# Patient Record
Sex: MALE | Race: WHITE | Employment: FULL TIME | URBAN - NONMETROPOLITAN AREA
[De-identification: names, ages, dates, MRNs, and addresses within clinical notes are randomized per-mention and may not be internally consistent; named-entity substitution may affect disease eponyms.]

---

## 2017-01-17 ENCOUNTER — OFFICE VISIT (OUTPATIENT)
Dept: URGENT CARE | Facility: PHYSICIAN GROUP | Age: 37
End: 2017-01-17
Payer: COMMERCIAL

## 2017-01-17 VITALS
HEART RATE: 70 BPM | BODY MASS INDEX: 35.5 KG/M2 | DIASTOLIC BLOOD PRESSURE: 72 MMHG | SYSTOLIC BLOOD PRESSURE: 130 MMHG | HEIGHT: 70 IN | RESPIRATION RATE: 16 BRPM | WEIGHT: 248 LBS | OXYGEN SATURATION: 98 % | TEMPERATURE: 98.4 F

## 2017-01-17 DIAGNOSIS — H10.9 BACTERIAL CONJUNCTIVITIS OF LEFT EYE: ICD-10-CM

## 2017-01-17 DIAGNOSIS — J98.8 RTI (RESPIRATORY TRACT INFECTION): ICD-10-CM

## 2017-01-17 PROCEDURE — 99203 OFFICE O/P NEW LOW 30 MIN: CPT | Mod: 25 | Performed by: PHYSICIAN ASSISTANT

## 2017-01-17 RX ORDER — AZITHROMYCIN 250 MG/1
TABLET, FILM COATED ORAL
Qty: 6 TAB | Refills: 0 | Status: SHIPPED | OUTPATIENT
Start: 2017-01-17 | End: 2017-08-01

## 2017-01-17 RX ORDER — POLYMYXIN B SULFATE AND TRIMETHOPRIM 1; 10000 MG/ML; [USP'U]/ML
1 SOLUTION OPHTHALMIC EVERY 4 HOURS
Qty: 10 ML | Refills: 0 | Status: SHIPPED | OUTPATIENT
Start: 2017-01-17 | End: 2017-08-01

## 2017-01-17 ASSESSMENT — ENCOUNTER SYMPTOMS
PALPITATIONS: 0
CHILLS: 1
EYE PAIN: 0
WHEEZING: 0
COUGH: 1
DOUBLE VISION: 0
FOREIGN BODY SENSATION: 0
EYE REDNESS: 1
ABDOMINAL PAIN: 0
VOMITING: 0
SORE THROAT: 1
NAUSEA: 0
BLURRED VISION: 0
MYALGIAS: 1
EYE DISCHARGE: 1
HEADACHES: 1
FEVER: 0
DIZZINESS: 0
SINUS PRESSURE: 1
DIARRHEA: 0
SHORTNESS OF BREATH: 0
SWOLLEN GLANDS: 1

## 2017-01-17 NOTE — PROGRESS NOTES
Subjective:      Kapil Small is a 36 y.o. male who presents with Sinus Problem            Sinus Problem  This is a new problem. The current episode started 1 to 4 weeks ago. The problem has been gradually worsening since onset. There has been no fever. His pain is at a severity of 6/10. The pain is moderate. Associated symptoms include chills, congestion, coughing, headaches, sinus pressure, a sore throat and swollen glands. Pertinent negatives include no ear pain or shortness of breath. Past treatments include nothing. The treatment provided no relief.   Eye Problem   The left eye is affected. This is a new problem. The current episode started in the past 7 days. The problem occurs constantly. The problem has been gradually worsening. There was no injury mechanism. The patient is experiencing no pain. There is no known exposure to pink eye. He does not wear contacts. Associated symptoms include an eye discharge, eye redness and a recent URI. Pertinent negatives include no blurred vision, double vision, fever, foreign body sensation, itching, nausea or vomiting. He has tried eye drops and water for the symptoms. The treatment provided no relief.       PMH:  has no past medical history on file.  MEDS: No current outpatient prescriptions on file.  ALLERGIES:   Allergies   Allergen Reactions   • Pcn [Penicillins] Hives     SURGHX: No past surgical history on file.  SOCHX:    FH: family history is not on file.    Review of Systems   Constitutional: Positive for chills and malaise/fatigue. Negative for fever.   HENT: Positive for congestion, sinus pressure and sore throat. Negative for ear pain.    Eyes: Positive for discharge and redness. Negative for blurred vision, double vision and pain.   Respiratory: Positive for cough. Negative for shortness of breath and wheezing.    Cardiovascular: Negative for chest pain, palpitations and leg swelling.   Gastrointestinal: Negative for nausea, vomiting, abdominal pain and  "diarrhea.   Musculoskeletal: Positive for myalgias.   Skin: Negative for itching and rash.   Neurological: Positive for headaches. Negative for dizziness.       Medications, Allergies, and current problem list reviewed today in Epic       Objective:     /72 mmHg  Pulse 70  Temp(Src) 36.9 °C (98.4 °F)  Resp 16  Ht 1.778 m (5' 10\")  Wt 112.492 kg (248 lb)  BMI 35.58 kg/m2  SpO2 98%     Physical Exam   Constitutional: He is oriented to person, place, and time. He appears well-developed and well-nourished. No distress.   HENT:   Head: Normocephalic.   Right Ear: Hearing, tympanic membrane and external ear normal.   Left Ear: Hearing, tympanic membrane and external ear normal.   Nose: Right sinus exhibits maxillary sinus tenderness. Left sinus exhibits maxillary sinus tenderness.   Mouth/Throat: Normal dentition. No dental caries. Posterior oropharyngeal erythema present. No oropharyngeal exudate.   Eyes: Conjunctivae and EOM are normal. Pupils are equal, round, and reactive to light. Lids are everted and swept, no foreign bodies found. Right eye exhibits no discharge and no exudate. No foreign body present in the right eye. Left eye exhibits exudate. Left eye exhibits no discharge. No foreign body present in the left eye. Right conjunctiva is not injected. No scleral icterus.   Slit lamp exam:       The right eye shows no corneal abrasion, no foreign body and no fluorescein uptake.        The left eye shows no corneal abrasion, no foreign body and no fluorescein uptake.   Neck: Normal range of motion. Neck supple. No tracheal deviation present. No thyromegaly present.   Cardiovascular: Normal rate, regular rhythm and normal heart sounds.    No murmur heard.  Pulmonary/Chest: Effort normal and breath sounds normal. No respiratory distress. He has no wheezes. He exhibits no tenderness.   Lymphadenopathy:        Head (right side): Submandibular adenopathy present.        Head (left side): Submandibular " adenopathy present.     He has no cervical adenopathy.        Right cervical: No superficial cervical adenopathy present.       Left cervical: No superficial cervical adenopathy present.   Neurological: He is alert and oriented to person, place, and time.   Skin: Skin is warm and dry. He is not diaphoretic. No cyanosis. Nails show no clubbing.   Psychiatric: He has a normal mood and affect. His behavior is normal. Judgment and thought content normal.   Nursing note and vitals reviewed.         Assessment/Plan:     1. RTI (respiratory tract infection)  azithromycin (ZITHROMAX Z-ANGELIKA) 250 MG Tab   2. Bacterial conjunctivitis of left eye  polymixin-trimethoprim (POLYTRIM) 93154-2.1 UNIT/ML-% Solution     1. Take full course of antibiotic  Tylenol and Motrin for fever and pain  OTC meds as discussed including oral decongestant if tolerated  Increase fluids and rest  Nasal spray, nasal wash, Tahira pot    2. Drops as directed, warm compresses    Return to clinic or go to ED if symptoms worsen or persist. Indications for ED discussed at length. Patient voices understanding. Follow-up with your primary care provider in 3-5 days. Red flags discussed.    Please note that this dictation was created using voice recognition software. I have made every reasonable attempt to correct obvious errors, but I expect that there are errors of grammar and possibly content that I did not discover before finalizing the note.

## 2017-01-17 NOTE — MR AVS SNAPSHOT
"        Kapil Small   2017 9:30 AM   Office Visit   MRN: [id number]    Department:  [facility]   Dept Phone:  [phone]    Description:  Male : 1980   Provider:  Dave Wu PA-C           Reason for Visit     Sinus Problem sore throat, eye issues      Allergies as of 2017     Allergen Noted Reactions    Pcn [Penicillins] 2017   Hives      You were diagnosed with     RTI (respiratory tract infection)   [467791]       Bacterial conjunctivitis of left eye   [235764]         Vital Signs     Blood Pressure Pulse Temperature Respirations Height Weight    130/72 mmHg 70 36.9 °C (98.4 °F) 16 1.778 m (5' 10\") 112.492 kg (248 lb)    Body Mass Index Oxygen Saturation                35.58 kg/m2 98%          Basic Information     Date Of Birth Sex Race Preferred Language       1980 Male White English       Health Maintenance     Patient has no pending health maintenance at this time      Current Immunizations     No immunizations on file.      Below and/or attached are the medications your provider expects you to take. Review all of your home medications and newly ordered medications with your provider and/or pharmacist. Follow medication instructions as directed by your provider and/or pharmacist. Please keep your medication list with you and share with your provider. Update the information when medications are discontinued, doses are changed, or new medications (including over-the-counter products) are added; and carry medication information at all times in the event of emergency situations     Allergies:  PCN - Hives               Medications  Valid as of: 2017 - 10:34 AM    Generic Name Brand Name Tablet Size Instructions for use    Azithromycin (Tab) ZITHROMAX 250 MG Z-walter, Use as directed.        Polymyxin B-Trimethoprim (Solution) POLYTRIM 32102-1.1 UNIT/ML-% Place 1 Drop in left eye every 4 hours.        .                 Medicines prescribed today were sent to: "     [facility] [address] [place] [address] - [address] [facility]    [address] [facility] [address]    Phone: [phone] Fax: [phone]    Open 24 Hours?: No      Medication refill instructions:       If your prescription bottle indicates you have medication refills left, it is not necessary to call your provider’s office. Please contact your pharmacy and they will refill your medication.    If your prescription bottle indicates you do not have any refills left, you may request refills at any time through one of the following ways: The online [email or web address] system (except Urgent Care), by calling your provider’s office, or by asking your pharmacy to contact your provider’s office with a refill request. Medication refills are processed only during regular business hours and may not be available until the next business day. Your provider may request additional information or to have a follow-up visit with you prior to refilling your medication.   *Please Note: Medication refills are assigned a new Rx number when refilled electronically. Your pharmacy may indicate that no refills were authorized even though a new prescription for the same medication is available at the pharmacy. Please request the medicine by name with the pharmacy before contacting your provider for a refill.           [email or web address].com Access Code: [email or web address]  Expires: 2/16/2017 10:34 AM    OSOYOU.com  A secure, online tool to manage your health information     [employer]’s OSOYOU.com® is a secure, online tool that connects you to your personalized health information from the privacy of your home -- day or night - making it very easy for you to manage your healthcare. Once the activation process is completed, you can even access your medical information using the OSOYOU.com binh, which is available for free in the Apple Binh store or Google Play store.     OSOYOU.com provides the following levels of access (as shown below):   My Chart Features   [facility]  Primary Care Doctor [facility]  Specialists [facility]  Urgent  Care [facility]  Primary Care  Doctor   Email your healthcare team securely and privately 24/7 X X X    Manage appointments: schedule your next appointment; view details of past/upcoming appointments X      Request prescription refills. X      View recent personal medical records, including lab and immunizations X X X X   View health record, including health history, allergies, medications X X X X   Read reports about your outpatient visits, procedures, consult and ER notes X X X X   See your discharge summary, which is a recap of your hospital and/or ER visit that includes your diagnosis, lab results, and care plan. X X       How to register for [employer] Systems:  1. Go to  [email or web address].  2. Click on the Sign Up Now box, which takes you to the New Member Sign Up page. You will need to provide the following information:  a. Enter your Plex Systems Access Code exactly as it appears at the top of this page. (You will not need to use this code after you’ve completed the sign-up process. If you do not sign up before the expiration date, you must request a new code.)   b. Enter your date of birth.   c. Enter your home email address.   d. Click Submit, and follow the next screen’s instructions.  3. Create a Plex Systems ID. This will be your Plex Systems login ID and cannot be changed, so think of one that is secure and easy to remember.  4. Create a Plex Systems password. You can change your password at any time.  5. Enter your Password Reset Question and Answer. This can be used at a later time if you forget your password.   6. Enter your e-mail address. This allows you to receive e-mail notifications when new information is available in Plex Systems.  7. Click Sign Up. You can now view your health information.    For assistance activating your Plex Systems account, call [phone]

## 2017-05-17 ENCOUNTER — OFFICE VISIT (OUTPATIENT)
Dept: URGENT CARE | Facility: PHYSICIAN GROUP | Age: 37
End: 2017-05-17
Payer: COMMERCIAL

## 2017-05-17 VITALS
BODY MASS INDEX: 35.73 KG/M2 | SYSTOLIC BLOOD PRESSURE: 132 MMHG | WEIGHT: 249 LBS | HEART RATE: 72 BPM | RESPIRATION RATE: 16 BRPM | DIASTOLIC BLOOD PRESSURE: 100 MMHG | TEMPERATURE: 97.8 F | OXYGEN SATURATION: 95 %

## 2017-05-17 DIAGNOSIS — H60.502 ACUTE OTITIS EXTERNA OF LEFT EAR, UNSPECIFIED TYPE: ICD-10-CM

## 2017-05-17 PROCEDURE — 99214 OFFICE O/P EST MOD 30 MIN: CPT | Performed by: PHYSICIAN ASSISTANT

## 2017-05-17 RX ORDER — NEOMYCIN SULFATE, POLYMYXIN B SULFATE, HYDROCORTISONE 3.5; 10000; 1 MG/ML; [USP'U]/ML; MG/ML
4 SOLUTION/ DROPS AURICULAR (OTIC) 4 TIMES DAILY
Qty: 1 BOTTLE | Refills: 0 | Status: SHIPPED | OUTPATIENT
Start: 2017-05-17 | End: 2017-10-09

## 2017-05-17 NOTE — MR AVS SNAPSHOT
Kapil Small   2017 4:20 PM   Office Visit   MRN: [id number]    Department:  [facility]   Dept Phone:  [phone]    Description:  Male : 1980   Provider:  Kenji Roman PA-C           Reason for Visit     Otalgia L, x1.5 days      Allergies as of 2017     Allergen Noted Reactions    Pcn [Penicillins] 2017   Hives      You were diagnosed with     Acute otitis externa of left ear, unspecified type   [5654094]         Vital Signs     Blood Pressure Pulse Temperature Respirations Weight Oxygen Saturation    132/100 mmHg 72 36.6 °C (97.8 °F) 16 112.946 kg (249 lb) 95%    Smoking Status                   Never Smoker            Basic Information     Date Of Birth Sex Race Ethnicity Preferred Language    1980 Male White Unknown English      Your appointments     May 18, 2017 10:00 AM   New Patient with C Rotation   [facility] (--)    [address] [facility]  [place] [id number] [phone]           Please bring enclosed paperwork completed along with your insurance card and photo ID.              Health Maintenance        Date Due Completion Dates    IMM DTaP/Tdap/Td Vaccine (1 - Tdap) 1999 ---            Current Immunizations     No immunizations on file.      Below and/or attached are the medications your provider expects you to take. Review all of your home medications and newly ordered medications with your provider and/or pharmacist. Follow medication instructions as directed by your provider and/or pharmacist. Please keep your medication list with you and share with your provider. Update the information when medications are discontinued, doses are changed, or new medications (including over-the-counter products) are added; and carry medication information at all times in the event of emergency situations     Allergies:  PCN - Hives               Medications  Valid as of: May 17, 2017 -  5:22 PM    Generic Name Brand Name  Tablet Size Instructions for use    Azithromycin (Tab) ZITHROMAX 250 MG Z-walter, Use as directed.        Neomycin-Polymyxin-HC (Solution) NEOMYCIN-POLYMYXIN-HC (OTIC) 1 % Place 4 Drops in ear 4 times a day.        Polymyxin B-Trimethoprim (Solution) POLYTRIM 96543-5.1 UNIT/ML-% Place 1 Drop in left eye every 4 hours.        .                 Medicines prescribed today were sent to:     [facility] [address] [place] [address], [place] - [address] [facility]    [address] [facility] [address]    Phone: [phone] Fax: [phone]    Open 24 Hours?: No      Medication refill instructions:       If your prescription bottle indicates you have medication refills left, it is not necessary to call your provider’s office. Please contact your pharmacy and they will refill your medication.    If your prescription bottle indicates you do not have any refills left, you may request refills at any time through one of the following ways: The online [employer] Enterprise system (except Urgent Care), by calling your provider’s office, or by asking your pharmacy to contact your provider’s office with a refill request. Medication refills are processed only during regular business hours and may not be available until the next business day. Your provider may request additional information or to have a follow-up visit with you prior to refilling your medication.   *Please Note: Medication refills are assigned a new Rx number when refilled electronically. Your pharmacy may indicate that no refills were authorized even though a new prescription for the same medication is available at the pharmacy. Please request the medicine by name with the pharmacy before contacting your provider for a refill.           Process System Enterprise Access Code: [email or web address]  Expires: 6/16/2017  5:22 PM    Process System Enterprise  A secure, online tool to manage your health information     [employer]’s Process System Enterprise® is a secure, online tool that connects you to your personalized health information  from the privacy of your home -- day or night - making it very easy for you to manage your healthcare. Once the activation process is completed, you can even access your medical information using the mobiliThink binh, which is available for free in the Apple Binh store or Google Play store.     mobiliThink provides the following levels of access (as shown below):   My Chart Features   [facility] Primary Care Doctor [facility]  Specialists [facility]  Urgent  Care [facility]  Primary Care  Doctor   Email your healthcare team securely and privately 24/7 X X X    Manage appointments: schedule your next appointment; view details of past/upcoming appointments X      Request prescription refills. X      View recent personal medical records, including lab and immunizations X X X X   View health record, including health history, allergies, medications X X X X   Read reports about your outpatient visits, procedures, consult and ER notes X X X X   See your discharge summary, which is a recap of your hospital and/or ER visit that includes your diagnosis, lab results, and care plan. X X       How to register for mobiliThink:  1. Go to  [email or web address].  2. Click on the Sign Up Now box, which takes you to the New Member Sign Up page. You will need to provide the following information:  a. Enter your mobiliThink Access Code exactly as it appears at the top of this page. (You will not need to use this code after you’ve completed the sign-up process. If you do not sign up before the expiration date, you must request a new code.)   b. Enter your date of birth.   c. Enter your home email address.   d. Click Submit, and follow the next screen’s instructions.  3. Create a mobiliThink ID. This will be your mobiliThink login ID and cannot be changed, so think of one that is secure and easy to remember.  4. Create a mobiliThink password. You can change your password at any time.  5. Enter your Password Reset Question and Answer. This can be used at a later time if you  forget your password.   6. Enter your e-mail address. This allows you to receive e-mail notifications when new information is available in Sag[employer].  7. Click Sign Up. You can now view your health information.    For assistance activating your [employer] account, call [phone]

## 2017-05-17 NOTE — PROGRESS NOTES
Chief Complaint   Patient presents with   • Otalgia     L, x1.5 days       HISTORY OF PRESENT ILLNESS: Patient is a 36 y.o. male who presents today because he has a 1-2 day history of left ear pain. The pain is in the left ear, it hurts to touch his left ear. He has not been putting any drops in it. He denies any fevers, chills, nausea, vomiting or diarrhea.    There are no active problems to display for this patient.      Allergies:Pcn    Current Outpatient Prescriptions Ordered in [facility]   Medication Sig Dispense Refill   • NEOMYCIN-POLYMYXIN-HC, OTIC, 1 % Solution Place 4 Drops in ear 4 times a day. 1 Bottle 0   • polymixin-trimethoprim (POLYTRIM) 62887-6.1 UNIT/ML-% Solution Place 1 Drop in left eye every 4 hours. 10 mL 0   • azithromycin (ZITHROMAX Z-ANGELIKA) 250 MG Tab Z-angelika, Use as directed. 6 Tab 0     No current Epic-ordered facility-administered medications on file.       No past medical history on file.    Social History   Substance Use Topics   • Smoking status: Never Smoker    • Smokeless tobacco: None   • Alcohol Use: None       No family status information on file.   No family history on file.    ROS:  Review of Systems   Constitutional: Negative for fever, chills, weight loss and malaise/fatigue.   HENT: Positive for left external ear pain, no nosebleeds, congestion, sore throat and neck pain.    Eyes: Negative for blurred vision.   Respiratory: Negative for cough, sputum production, shortness of breath and wheezing.    Cardiovascular: Negative for chest pain, palpitations, orthopnea and leg swelling.   Gastrointestinal: Negative for heartburn, nausea, vomiting and abdominal pain.       Exam:  Blood pressure 132/100, pulse 72, temperature 36.6 °C (97.8 °F), resp. rate 16, weight 112.946 kg (249 lb), SpO2 95 %.  General:  Well nourished, well developed male in NAD  Head:Normocephalic, atraumatic  Eyes: PERRLA, EOM within normal limits, no conjunctival injection, no scleral icterus, visual fields and acuity  grossly intact.  Ears: Normal shape and symmetry, he has left tragal tenderness, no discharge. External canal on the left is erythematous and edematous, external canal. The right is without any significant edema or erythema. Tympanic membranes are without any inflammation, no effusion. Gross auditory acuity is intact  Nose: Symmetrical without tenderness, no discharge.  Mouth: reasonable hygiene, no erythema exudates or tonsillar enlargement.  Neck: no masses, range of motion within normal limits, no tracheal deviation. No obvious thyroid enlargement.  Pulmonary: chest is symmetrical with respiration, no wheezes, crackles, or rhonchi.  Cardiovascular: regular rate and rhythm without murmurs, rubs, or gallops.  Extremities: no clubbing, cyanosis, or edema.    Please note that this dictation was created using voice recognition software. I have made every reasonable attempt to correct obvious errors, but I expect that there are errors of grammar and possibly content that I did not discover before finalizing the note.    Assessment/Plan:  1. Acute otitis externa of left ear, unspecified type  NEOMYCIN-POLYMYXIN-HC, OTIC, 1 % Solution   , Tylenol or ibuprofen as tolerated    Followup with primary care in the next 7-10 days if not significantly improving, return to the urgent care or go to the emergency room sooner for any worsening of symptoms.

## 2017-05-18 ENCOUNTER — SLEEP CENTER VISIT (OUTPATIENT)
Dept: SLEEP MEDICINE | Facility: MEDICAL CENTER | Age: 37
End: 2017-05-18
Payer: COMMERCIAL

## 2017-05-18 VITALS
DIASTOLIC BLOOD PRESSURE: 92 MMHG | TEMPERATURE: 97 F | BODY MASS INDEX: 34.93 KG/M2 | HEART RATE: 64 BPM | SYSTOLIC BLOOD PRESSURE: 130 MMHG | OXYGEN SATURATION: 95 % | RESPIRATION RATE: 16 BRPM | WEIGHT: 244 LBS | HEIGHT: 70 IN

## 2017-05-18 DIAGNOSIS — R06.83 SNORING: ICD-10-CM

## 2017-05-18 DIAGNOSIS — G47.10 HYPERSOMNOLENCE: ICD-10-CM

## 2017-05-18 DIAGNOSIS — G47.30 SLEEP APNEA, UNSPECIFIED TYPE: ICD-10-CM

## 2017-05-18 PROCEDURE — 99204 OFFICE O/P NEW MOD 45 MIN: CPT | Performed by: INTERNAL MEDICINE

## 2017-05-18 RX ORDER — PSEUDOEPHEDRINE HCL 120 MG/1
120 TABLET, FILM COATED, EXTENDED RELEASE ORAL 2 TIMES DAILY PRN
COMMUNITY
Start: 2017-05-18 | End: 2017-10-09

## 2017-05-18 RX ORDER — OMEPRAZOLE 20 MG/1
CAPSULE, DELAYED RELEASE ORAL
COMMUNITY
Start: 2017-03-10 | End: 2017-04-10

## 2017-05-18 RX ORDER — CYCLOBENZAPRINE HCL 10 MG
TABLET ORAL
COMMUNITY
Start: 2017-03-27 | End: 2017-04-27

## 2017-05-18 NOTE — PROGRESS NOTES
CC:  Snoring, nocturnal apnea and excessive daytime somnolence, suspected sleep apnea hypopnea syndrome.    HPI:   Mr. Small is a 36-year-old man referred by Dr. Johnson to assist in the evaluation and management of suspected sleep-disordered breathing.    He is followed for history of systemic arterial hypertension and he has gained 20-30 pounds in weight over the past 3 years. In the course of his clinical follow-up, symptoms suggesting sleep-disordered breathing were identified, prompting the current consultation.    He has an irregular sleep schedule, related to work shifts. He alternates 7 daysdays on and 7 days off and during his work weeks he alternates between day shifts and night shifts. When he is on the day shift he typically goes to bed at 9-10 PM and falls asleep quickly. He rarely awakens at night and arises at 3:30 in the morning on work days, sleeping till about 8 on days off. He does not experience grogginess or morning headaches. His wife notes that he has snored loudly and had evident pauses in breathing during sleep for many years, terminated by resuscitative gasping. He is sleepy during the day and frequently dozes off while reading, watching television or as a passenger in a motor vehicle. He does nap on an occasional basis. His [place] sleepiness score is elevated at 11 points. He drinks caffeinated beverages moderately and does not use substances to induce sleep or to maintain wakefulness. He has not previously been evaluated for sleep problems. He does not have symptoms suggesting narcolepsy, parasomnia or restless leg syndrome. He notes that his father snored and was sleepy during the day.      Past Medical History   Diagnosis Date   • Chickenpox        Past Surgical History   Procedure Laterality Date   • Dental extraction(s)       Hope Teeth       Family History   Problem Relation Age of Onset   • Heart Disease Father    • Sleep Apnea Neg Hx    • Hypertension Neg Hx    • Respiratory  "Disease Neg Hx    • Asthma Neg Hx    • Cancer Neg Hx        Social History     Social History   • Marital Status:      Spouse Name: N/A   • Number of Children: N/A   • Years of Education: N/A     Occupational History   • Not on file.     Social History Main Topics   • Smoking status: Former Smoker -- 0.50 packs/day for 18 years     Types: Cigarettes     Quit date: 03/01/2017   • Smokeless tobacco: Never Used   • Alcohol Use: Not on file      Comment: 1 drink almost daily   • Drug Use: Yes     Special: Marijuana      Comment: Rarely (recreationally/medicinally)   • Sexual Activity: Not on file     Other Topics Concern   • Not on file     Social History Narrative       Current Outpatient Prescriptions   Medication Sig Dispense Refill   • pseudoephedrine SR (SUDAFED 12 HOUR) 120 MG TABLET SR 12 HR Take 120 mg by mouth 2 times a day as needed for Congestion. Indications: Cold Symptoms     • NEOMYCIN-POLYMYXIN-HC, OTIC, 1 % Solution Place 4 Drops in ear 4 times a day. 1 Bottle 0   • polymixin-trimethoprim (POLYTRIM) 36655-9.1 UNIT/ML-% Solution Place 1 Drop in left eye every 4 hours. (Patient not taking: Reported on 5/18/2017) 10 mL 0   • azithromycin (ZITHROMAX Z-ANGELIKA) 250 MG Tab Z-angelika, Use as directed. (Patient not taking: Reported on 5/18/2017) 6 Tab 0     No current facility-administered medications for this visit.    \"CURRENT RX\"      Allergies: Pcn      ROS  Positive for the sleep issues reviewed above. He has some occasional heartburn without melena or hematochezia. He denies chest pain or palpitations, shortness of breath or regular cough. All other aspects of the CPT review of systems process are negative.      Physical Exam:   /92 mmHg  Pulse 64  Temp(Src) 36.1 °C (97 °F)  Resp 16  Ht 1.778 m (5' 10\")  Wt 110.678 kg (244 lb)  BMI 35.01 kg/m2  SpO2 95%. Neck circumference is 16.25 inches.   Head and neck examination demonstrates no mucosal lesion, purulent drainage or evident polyps. The " pharynx is benign with a Mallampati III presentation. The neck is supple without thyromegaly. On chest examination there are symmetrical bilateral breath sounds without rales, wheezing or consolidation. On cardiac examination, the apical impulse and heart sounds are normal and the rhythm is regular. There is no murmur, gallop or rub and no jugular venous distention. The abdomen is soft with active bowel sounds and no palpable hepatosplenomegaly, mass, guarding or rebound. The extremities show no clubbing, cyanosis or edema and no signs of deep venous thrombosis. There is no warmth, redness, tenderness or palpable venous cord in the calves. The skin is clear, warm and dry. There is no unusual peripheral lymphadenopathy. Peripheral pulses are palpable in all 4 extremities. On neurologic examination, cranial nerve function is intact, motor tone is symmetrical, and the patient is alert, oriented and responsive.       Problems:  1. Sleep apnea, unspecified type  He presents with snoring, witnessed nocturnal apnea and excessive daytime somnolence. He has a crowded posterior pharyngeal airway and a body mass index of 35 as well as a history of systemic arterial hypertension and recent weight gain.  The clinical probability of sleep apnea hypopnea syndrome is significant. Accurate diagnosis and effective treatment are required not only to improve levels of daytime alertness but also to reduce the cardiac and neurologic risks associated with untreated sleep apnea. We have discussed diagnostic options including in-laboratory, attended polysomnography and home sleep testing. We have also discussed treatment options including airway pressurization, reconstructive otolaryngologic surgery, dental appliances and weight management.    2. Hypersomnolence  Probably related to the suspected sleep-disordered breathing but also to a significant sleep-wake cycle problem related to work shifts and involving short time in bed on weeks that  he works.    3. Snoring    4. 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      Plan:   Home sleep test.    Return visit afterwards to discuss test results and treatment options.    We appreciate the opportunity to assist in his care.

## 2017-05-18 NOTE — MR AVS SNAPSHOT
"        Kapil Small   2017 10:00 AM   Sleep Center Visit   MRN: [id number]    Department:  [facility]   Dept Phone:  [phone]    Description:  Male : 1980   Provider:  Tae RODGERS M.D.           Reason for Visit     New Patient Evaluate CASEY      Allergies as of 2017     Allergen Noted Reactions    Pcn [Penicillins] 2017   Hives      You were diagnosed with     Sleep apnea, unspecified type   [4369502]       Hypersomnolence   [612716]       Snoring   [757678]         Vital Signs     Blood Pressure Pulse Temperature Respirations Height Weight    130/92 mmHg 64 36.1 °C (97 °F) 16 1.778 m (5' 10\") 110.678 kg (244 lb)    Body Mass Index Oxygen Saturation Smoking Status             35.01 kg/m2 95% Former Smoker         Basic Information     Date Of Birth Sex Race Ethnicity Preferred Language    1980 Male White Unknown English      Your appointments     2017  2:00 PM   Sleep Study Home with SLEEP CLINIC   [facility] (--)    [address]  [place] [address]   [phone]            2017 12:00 PM   Follow UP with [facility] (--)    [address]  [place] [address]   [phone]              Health Maintenance        Date Due Completion Dates    IMM DTaP/Tdap/Td Vaccine (1 - Tdap) 1999 ---            Current Immunizations     No immunizations on file.      Below and/or attached are the medications your provider expects you to take. Review all of your home medications and newly ordered medications with your provider and/or pharmacist. Follow medication instructions as directed by your provider and/or pharmacist. Please keep your medication list with you and share with your provider. Update the information when medications are discontinued, doses are changed, or new medications (including over-the-counter products) are added; and carry medication information " at all times in the event of emergency situations     Allergies:  PCN - Hives               Medications  Valid as of: May 18, 2017 - 10:52 AM    Generic Name Brand Name Tablet Size Instructions for use    Azithromycin (Tab) ZITHROMAX 250 MG Z-walter, Use as directed.        Neomycin-Polymyxin-HC (Solution) NEOMYCIN-POLYMYXIN-HC (OTIC) 1 % Place 4 Drops in ear 4 times a day.        Polymyxin B-Trimethoprim (Solution) POLYTRIM 87736-3.1 UNIT/ML-% Place 1 Drop in left eye every 4 hours.        Pseudoephedrine HCl (TABLET SR 12 HR) SUDAFED  MG Take 120 mg by mouth 2 times a day as needed for Congestion. Indications: Cold Symptoms        .                 Medicines prescribed today were sent to:     [facility] [address] [place] [address]    [address] [place] [address]    Phone: [phone] Fax: [phone]    Open 24 Hours?: No      Medication refill instructions:       If your prescription bottle indicates you have medication refills left, it is not necessary to call your provider’s office. Please contact your pharmacy and they will refill your medication.    If your prescription bottle indicates you do not have any refills left, you may request refills at any time through one of the following ways: The online Venuu system (except Urgent Care), by calling your provider’s office, or by asking your pharmacy to contact your provider’s office with a refill request. Medication refills are processed only during regular business hours and may not be available until the next business day. Your provider may request additional information or to have a follow-up visit with you prior to refilling your medication.   *Please Note: Medication refills are assigned a new Rx number when refilled electronically. Your pharmacy may indicate that no refills were authorized even though a new prescription for the same medication is available at the pharmacy. Please request the medicine by name with  the pharmacy before contacting your provider for a refill.        Your To Do List     Future Labs/Procedures Complete By Expires    OVERNIGHT HOME SLEEP STUDY  As directed 5/18/2018         C2cube Access Code: [email or web address]  Expires: 6/16/2017  5:22 PM    C2cube  A secure, online tool to manage your health information     [employer]’s C2cube® is a secure, online tool that connects you to your personalized health information from the privacy of your home -- day or night - making it very easy for you to manage your healthcare. Once the activation process is completed, you can even access your medical information using the C2cube binh, which is available for free in the Apple Binh store or Google Play store.     C2cube provides the following levels of access (as shown below):   My Chart Features   [facility] Doctor [facility]  Specialists [facility]  Urgent  Care Non-[facility]  Primary Care  Doctor   Email your healthcare team securely and privately 24/7 X X X    Manage appointments: schedule your next appointment; view details of past/upcoming appointments X      Request prescription refills. X      View recent personal medical records, including lab and immunizations X X X X   View health record, including health history, allergies, medications X X X X   Read reports about your outpatient visits, procedures, consult and ER notes X X X X   See your discharge summary, which is a recap of your hospital and/or ER visit that includes your diagnosis, lab results, and care plan. X X       How to register for C2cube:  1. Go to  [email or web address].  2. Click on the Sign Up Now box, which takes you to the New Member Sign Up page. You will need to provide the following information:  a. Enter your C2cube Access Code exactly as it appears at the top of this page. (You will not need to use this code after you’ve completed the sign-up process. If you do not sign up before the expiration date, you must request a  new code.)   b. Enter your date of birth.   c. Enter your home email address.   d. Click Submit, and follow the next screen’s instructions.  3. Create a Appian ID. This will be your Appian login ID and cannot be changed, so think of one that is secure and easy to remember.  4. Create a Appian password. You can change your password at any time.  5. Enter your Password Reset Question and Answer. This can be used at a later time if you forget your password.   6. Enter your e-mail address. This allows you to receive e-mail notifications when new information is available in Appian.  7. Click Sign Up. You can now view your health information.    For assistance activating your Appian account, call [phone]

## 2017-06-01 ENCOUNTER — HOME STUDY (OUTPATIENT)
Dept: SLEEP MEDICINE | Facility: MEDICAL CENTER | Age: 37
End: 2017-06-01
Attending: INTERNAL MEDICINE
Payer: COMMERCIAL

## 2017-06-01 DIAGNOSIS — G47.10 HYPERSOMNOLENCE: ICD-10-CM

## 2017-06-01 DIAGNOSIS — R06.83 SNORING: ICD-10-CM

## 2017-06-01 DIAGNOSIS — G47.30 SLEEP APNEA, UNSPECIFIED TYPE: ICD-10-CM

## 2017-06-01 PROCEDURE — 95806 SLEEP STUDY UNATT&RESP EFFT: CPT | Performed by: INTERNAL MEDICINE

## 2017-06-01 NOTE — MR AVS SNAPSHOT
Kapil Small   2017 2:00 PM   Appointment   MRN: [id number]    Department:  [facility]   Dept Phone:  [phone]    Description:  Male : 1980   Provider:  SLEEP CLINIC           Allergies as of 2017     Allergen Noted Reactions    Pcn [Penicillins] 2017   Hives      You were diagnosed with     Sleep apnea, unspecified type   [9835193]       Hypersomnolence   [464297]       Snoring   [475785]         Vital Signs     Smoking Status                   Former Smoker           Basic Information     Date Of Birth Sex Race Ethnicity Preferred Language    1980 Male White Unknown English      Your appointments     2017 12:00 PM   [facility]    [address] [place] [address] [place]  Jesus JACKSON [id number] [phone]              Health Maintenance        Date Due Completion Dates    IMM DTaP/Tdap/Td Vaccine (1 - Tdap) 1999 ---            Current Immunizations     No immunizations on file.      Below and/or attached are the medications your provider expects you to take. Review all of your home medications and newly ordered medications with your provider and/or pharmacist. Follow medication instructions as directed by your provider and/or pharmacist. Please keep your medication list with you and share with your provider. Update the information when medications are discontinued, doses are changed, or new medications (including over-the-counter products) are added; and carry medication information at all times in the event of emergency situations     Allergies:  PCN - Hives               Medications  Valid as of: 2017 -  2:07 PM    Generic Name Brand Name Tablet Size Instructions for use    Azithromycin (Tab) ZITHROMAX 250 MG Z-walter, Use as directed.        Neomycin-Polymyxin-HC (Solution) NEOMYCIN-POLYMYXIN-HC (OTIC) 1 % Place 4 Drops in ear 4 times a day.        Polymyxin B-Trimethoprim (Solution) POLYTRIM  31055-2.1 UNIT/ML-% Place 1 Drop in left eye every 4 hours.        Pseudoephedrine HCl (TABLET SR 12 HR) SUDAFED  MG Take 120 mg by mouth 2 times a day as needed for Congestion. Indications: Cold Symptoms        .                 Medicines prescribed today were sent to:     [facility] [address] [place] [address], [place] - [address] [facility]    [address] [facility] [place] [address]    Phone: [phone] Fax: [phone]    Open 24 Hours?: No      Medication refill instructions:       If your prescription bottle indicates you have medication refills left, it is not necessary to call your provider’s office. Please contact your pharmacy and they will refill your medication.    If your prescription bottle indicates you do not have any refills left, you may request refills at any time through one of the following ways: The online Shelf.com system (except Urgent Care), by calling your provider’s office, or by asking your pharmacy to contact your provider’s office with a refill request. Medication refills are processed only during regular business hours and may not be available until the next business day. Your provider may request additional information or to have a follow-up visit with you prior to refilling your medication.   *Please Note: Medication refills are assigned a new Rx number when refilled electronically. Your pharmacy may indicate that no refills were authorized even though a new prescription for the same medication is available at the pharmacy. Please request the medicine by name with the pharmacy before contacting your provider for a refill.           Shelf.com Access Code: [email or web address]  Expires: 6/16/2017  5:22 PM    Shelf.com  A secure, online tool to manage your health information     [employer]’s Shelf.com® is a secure, online tool that connects you to your personalized health information from the privacy of your home -- day or night - making it very easy for you to manage your healthcare. Once  the activation process is completed, you can even access your medical information using the [employer] [place] binh, which is available for free in the Apple Binh store or Google Play store.     [employer] [place] provides the following levels of access (as shown below):   My Chart Features   [facility] Doctor [facility]  Specialists [facility]  Urgent  Care Non-[facility]  Primary Care  Doctor   Email your healthcare team securely and privately 24/7 X X X    Manage appointments: schedule your next appointment; view details of past/upcoming appointments X      Request prescription refills. X      View recent personal medical records, including lab and immunizations X X X X   View health record, including health history, allergies, medications X X X X   Read reports about your outpatient visits, procedures, consult and ER notes X X X X   See your discharge summary, which is a recap of your hospital and/or ER visit that includes your diagnosis, lab results, and care plan. X X       How to register for [employer] [place]:  1. Go to  [email or web address].  2. Click on the Sign Up Now box, which takes you to the New Member Sign Up page. You will need to provide the following information:  a. Enter your [employer] [place] Access Code exactly as it appears at the top of this page. (You will not need to use this code after you’ve completed the sign-up process. If you do not sign up before the expiration date, you must request a new code.)   b. Enter your date of birth.   c. Enter your home email address.   d. Click Submit, and follow the next screen’s instructions.  3. Create a [employer] [place] ID. This will be your [employer] [place] login ID and cannot be changed, so think of one that is secure and easy to remember.  4. Create a [employer] [place] password. You can change your password at any time.  5. Enter your Password Reset Question and Answer. This can be used at a later time if you forget your password.   6. Enter your e-mail address. This allows you to receive e-mail notifications when  new information is available in C[employer] Braxis.  7. Click Sign Up. You can now view your health information.    For assistance activating your [employer] Braxis account, call [phone]

## 2017-06-02 NOTE — PROCEDURES
The patient is a 36-year-old male with snoring, witnessed apneas, weight gain and nonrestorative sleep, with high clinical suspicion for obstructive sleep apnea syndrome. He is a shift worker. Overnight polysomnography is indicated for screening of suspected sleep-disordered breathing. He has no contraindications to home sleep study monitoring.    A total recording time of 7 hours and 35 minutes was obtained with good quality data noted.    Near constant snoring was noted associated with obstructive respiratory events. The overall AHI was 45 events per hour. In the supine position the AHI was 65 events per hour. The respiratory events were associated with desaturations below 88% for over 2 hours, to a georgina of 73%. Average pulse rate was 58 bpm with tachybradycardia syndrome noted.    Impression:  Severe obstructive sleep apnea syndrome, with AHI 65 events per hour supine, associated with severe hypoxia.  Tachybradycardia syndrome noted.    Recommendations:  The patient should follow-up in the sleep clinic to discuss test results in detail.  Given the severity of CASEY, airway pressurization (CPAP versus AutoPap) is the treatment modality recommended. If alternative therapies such as UPPP or a dental appliance are pursued, then a follow-up polysomnogram is indicated to confirm adequacy of treatment.  Positional modification therapy encouraged, i.e. avoiding sleeping supine.  Weight loss, avoidance of alcohol, sedatives, and muscle relaxants, is additionally recommended.

## 2017-06-05 ENCOUNTER — SLEEP CENTER VISIT (OUTPATIENT)
Dept: SLEEP MEDICINE | Facility: MEDICAL CENTER | Age: 37
End: 2017-06-05
Payer: COMMERCIAL

## 2017-06-05 VITALS
RESPIRATION RATE: 16 BRPM | HEIGHT: 70 IN | WEIGHT: 244 LBS | HEART RATE: 57 BPM | SYSTOLIC BLOOD PRESSURE: 144 MMHG | OXYGEN SATURATION: 97 % | DIASTOLIC BLOOD PRESSURE: 80 MMHG | BODY MASS INDEX: 34.93 KG/M2

## 2017-06-05 DIAGNOSIS — G47.33 OBSTRUCTIVE SLEEP APNEA HYPOPNEA, SEVERE: ICD-10-CM

## 2017-06-05 PROCEDURE — 99213 OFFICE O/P EST LOW 20 MIN: CPT | Performed by: INTERNAL MEDICINE

## 2017-06-05 NOTE — MR AVS SNAPSHOT
"        Kapil Small   2017 2:00 PM   Sleep Center Visit   MRN: [id number]    Department:  [facility]   Dept Phone:  [phone]    Description:  Male : 1980   Provider:  Win Gibson M.D.           Reason for Visit     Follow-Up HST results      Allergies as of 2017     Allergen Noted Reactions    Pcn [Penicillins] 2017   Hives      You were diagnosed with     Obstructive sleep apnea hypopnea, severe   [1041219]         Vital Signs     Blood Pressure Pulse Respirations Height Weight Body Mass Index    144/80 mmHg 57 16 1.778 m (5' 10\") 110.678 kg (244 lb) 35.01 kg/m2    Oxygen Saturation Smoking Status                97% Former Smoker          Basic Information     Date Of Birth Sex Race Ethnicity Preferred Language    1980 Male White Unknown English      Your appointments     2017  8:05 PM   Sleep Study with SLEEP TECH   [facility] Sleep [facility] (--)    [address]  [facility]  [place] [id number] [phone]            Aug 01, 2017  8:20 AM   Follow UP with TESSA Rudd   [facility] Sleep Medicine (--)    [address]  [facility]  [place] [id number] [phone]              Health Maintenance        Date Due Completion Dates    IMM DTaP/Tdap/Td Vaccine (1 - Tdap) 1999 ---            Current Immunizations     No immunizations on file.      Below and/or attached are the medications your provider expects you to take. Review all of your home medications and newly ordered medications with your provider and/or pharmacist. Follow medication instructions as directed by your provider and/or pharmacist. Please keep your medication list with you and share with your provider. Update the information when medications are discontinued, doses are changed, or new medications (including over-the-counter products) are added; and carry medication information at all times in the event of emergency situations     Allergies:  PCN - " Hives               Medications  Valid as of: June 05, 2017 -  2:13 PM    Generic Name Brand Name Tablet Size Instructions for use    Azithromycin (Tab) ZITHROMAX 250 MG Z-walter, Use as directed.        Neomycin-Polymyxin-HC (Solution) NEOMYCIN-POLYMYXIN-HC (OTIC) 1 % Place 4 Drops in ear 4 times a day.        Polymyxin B-Trimethoprim (Solution) POLYTRIM 33996-9.1 UNIT/ML-% Place 1 Drop in left eye every 4 hours.        Pseudoephedrine HCl (TABLET SR 12 HR) SUDAFED  MG Take 120 mg by mouth 2 times a day as needed for Congestion. Indications: Cold Symptoms        .                 Medicines prescribed today were sent to:     [facility] [address] [place] [address] [address] [facility]    [address] [facility] [address]    Phone: [phone] Fax: [phone]    Open 24 Hours?: No      Medication refill instructions:       If your prescription bottle indicates you have medication refills left, it is not necessary to call your provider’s office. Please contact your pharmacy and they will refill your medication.    If your prescription bottle indicates you do not have any refills left, you may request refills at any time through one of the following ways: The online SignalDemand system (except Urgent Care), by calling your provider’s office, or by asking your pharmacy to contact your provider’s office with a refill request. Medication refills are processed only during regular business hours and may not be available until the next business day. Your provider may request additional information or to have a follow-up visit with you prior to refilling your medication.   *Please Note: Medication refills are assigned a new Rx number when refilled electronically. Your pharmacy may indicate that no refills were authorized even though a new prescription for the same medication is available at the pharmacy. Please request the medicine by name with the pharmacy before contacting your provider for a refill.        Your  To Do List     Future Labs/Procedures Complete By Expires    POLYSOMNOGRAPHY TITRATION  As directed 6/5/2018      Instructions    1.  We have scheduled a CPAP titration sleep study. If insurance denies the the plan will be to start an AutoPap machine  2. Recommended follow-up after the sleep study          Zet Universe Access Code: [email or web address]  Expires: 6/16/2017  5:22 PM    Zet Universe  A secure, online tool to manage your health information     [employer]’s Zet Universe® is a secure, online tool that connects you to your personalized health information from the privacy of your home -- day or night - making it very easy for you to manage your healthcare. Once the activation process is completed, you can even access your medical information using the Zet Universe binh, which is available for free in the Apple Binh store or Google Play store.     Zet Universe provides the following levels of access (as shown below):   My Chart Features   [facility] Primary Care Doctor [facility]  Specialists [facility]  Urgent  Care Non-[facility]  Primary Care  Doctor   Email your healthcare team securely and privately 24/7 X X X    Manage appointments: schedule your next appointment; view details of past/upcoming appointments X      Request prescription refills. X      View recent personal medical records, including lab and immunizations X X X X   View health record, including health history, allergies, medications X X X X   Read reports about your outpatient visits, procedures, consult and ER notes X X X X   See your discharge summary, which is a recap of your hospital and/or ER visit that includes your diagnosis, lab results, and care plan. X X       How to register for Zet Universe:  1. Go to  [email or web address].  2. Click on the Sign Up Now box, which takes you to the New Member Sign Up page. You will need to provide the following information:  a. Enter your Zet Universe Access Code exactly as it appears at the top of this page. (You will not need to use this  code after you’ve completed the sign-up process. If you do not sign up before the expiration date, you must request a new code.)   b. Enter your date of birth.   c. Enter your home email address.   d. Click Submit, and follow the next screen’s instructions.  3. Create a SpotOnWayt ID. This will be your SpotOnWayt login ID and cannot be changed, so think of one that is secure and easy to remember.  4. Create a SpotOnWayt password. You can change your password at any time.  5. Enter your Password Reset Question and Answer. This can be used at a later time if you forget your password.   6. Enter your e-mail address. This allows you to receive e-mail notifications when new information is available in SureWaves.  7. Click Sign Up. You can now view your health information.    For assistance activating your SureWaves account, call [phone]

## 2017-06-05 NOTE — PROGRESS NOTES
Kapil Small is a 36 y.o. male here for obstructive sleep apnea.    History of Present Illness:    The patient is a 36 showed male who has a history of high blood pressure and weight gain. He has had symptoms of snoring and witnessed apnea and excessive daytime somnolence. The patient underwent a home sleep recording. The apnea hypopnea index was 45 per hour and a low oxygen saturation of 75%. The patient comes in today to review the study and he has no new complaints.    Constitutional:  Negative for fever, chills, sweats, and fatigue.  Eyes:  Negative for eye pain and visual changes.  HENT:  Negative for tinnitus and hoarse voice.  Cardiovascular:  Negative for chest pain, leg swelling, syncope and orthopnea.  Respiratory:  See HPI for pertinent negatives  Sleep:  Negative for somnolence, loud snoring, sleep disturbance due to breathing, insomnia.  Gastrointestinal:  Negative for dysphagia, nausea and abdominal pain.  Heme/lymph:  Denies easy bruising, blood clots.  Musculoskeletal:  Negative for arthralgias, sore muscles and back pain.  Skin:  Negative for rash and color change.  Neurological:  Negative for headaches, lightheadedness and weakness.  Psychiatric:  Denies depression.    Current Outpatient Prescriptions   Medication Sig Dispense Refill   • pseudoephedrine SR (SUDAFED 12 HOUR) 120 MG TABLET SR 12 HR Take 120 mg by mouth 2 times a day as needed for Congestion. Indications: Cold Symptoms     • NEOMYCIN-POLYMYXIN-HC, OTIC, 1 % Solution Place 4 Drops in ear 4 times a day. 1 Bottle 0   • polymixin-trimethoprim (POLYTRIM) 81052-5.1 UNIT/ML-% Solution Place 1 Drop in left eye every 4 hours. (Patient not taking: Reported on 5/18/2017) 10 mL 0   • azithromycin (ZITHROMAX Z-ANGELIKA) 250 MG Tab Z-angelika, Use as directed. (Patient not taking: Reported on 5/18/2017) 6 Tab 0     No current facility-administered medications for this visit.       Social History   Substance Use Topics   • Smoking status: Former Smoker --  "0.50 packs/day for 18 years     Types: Cigarettes     Quit date: 03/01/2017   • Smokeless tobacco: Never Used   • Alcohol Use: None      Comment: 1 drink almost daily       Past Medical History   Diagnosis Date   • Chickenpox        Past Surgical History   Procedure Laterality Date   • Dental extraction(s)       [place] Teeth       Allergies:  Pcn    Family History   Problem Relation Age of Onset   • Heart Disease Father    • Sleep Apnea Neg Hx    • Hypertension Neg Hx    • Respiratory Disease Neg Hx    • Asthma Neg Hx    • Cancer Neg Hx        Physical Examination    Filed Vitals:    06/05/17 1355   Height: 1.778 m (5' 10\")   Weight: 110.678 kg (244 lb)   Weight % change since last entry.: 0 %   BP: 144/80   Pulse: 57   BMI (Calculated): 35.01   Resp: 16   O2 sat % room air: 97 %       Physical Exam:  Constitutional:  Well developed and well nourished.  Head:  Normocephalic and atraumatic.  Nose:  Nose normal.  Mouth/Throat:  Oropharynx is clear and moist, no lesions.  Mallampati class III  Neck:  Normal range of motion.  Supple.  No JVD.  Cardiovascular:  Normal rate, regular rhythm, normal heart sounds. No edema  Pulmonary/Chest: No wheezing, rales or rhonchi.  Respiratory effort non labored  Musculoskeletal.  No muscular atrophy.  Lymphadenopathy:  No cervical or supraclavicular adenopathy  Neurological:  Alert and oriented.  Cranial nerves intact.  No focal deficits  Skin:  No rashes or ulcers.  Psyciatric:  Normal mood and affect.    Assessment and Plan:  1. Obstructive sleep apnea hypopnea, severe  The home sleep study showed severe obstructive sleep apnea with severe oxygen desaturations. Treatment for sleep apnea is recommended. CPAP therapy is recommended. I have recommended a CPAP titration. If insurance denies and we can try an AutoPap machine. He was advised to avoid alcohol and sedatives and to not operate a motor vehicle while drowsy. We also discussed the fact that untreated sleep apnea is associated " with an increased risk for cardiovascular disease.  - POLYSOMNOGRAPHY TITRATION; Future        Followup Return in about 6 weeks (around 7/17/2017) for follow up with the sleep physician, follow up visit with APRN, after sleep study.

## 2017-07-27 ENCOUNTER — SLEEP STUDY (OUTPATIENT)
Dept: SLEEP MEDICINE | Facility: MEDICAL CENTER | Age: 37
End: 2017-07-27
Attending: INTERNAL MEDICINE
Payer: COMMERCIAL

## 2017-07-27 DIAGNOSIS — G47.33 OBSTRUCTIVE SLEEP APNEA HYPOPNEA, SEVERE: ICD-10-CM

## 2017-07-27 PROCEDURE — 95811 POLYSOM 6/>YRS CPAP 4/> PARM: CPT | Performed by: INTERNAL MEDICINE

## 2017-07-28 NOTE — PROCEDURES
Clinical Comments:  The patient underwent a comprehensive polysomnogram using the standard montage for measurement of parameters of sleep, respiratory events, movement abnormalities, heart rate and rhythm. A microphone was used to monitor snoring.        INTERPRETATION:  The total recording time was 476.3 minutes with a sleep period of 471.7 minutes and the total sleep time was 456.2 minutes with a sleep efficiency of 95.8%.  The sleep latency was 4.7 minutes, and REM latency was 69.5 minutes.  The patient experienced 45 arousals in total, for an arousal index of 5.9     RESPIRATORY: The patient had 2 apneas in total.  Of these, 1 were obstructive apneas, and 1 were central apneas.  This resulted in an apnea index (AI) of 0.3.  The patient had 78 hypopneas, for a hypopnea index of 10.3.  The overall AHI was 10.5, while the AHI during Stage R sleep was 30.4.  AHI while supine was 12.8.     OXIMETRY: Oxygen saturation monitoring showed a mean SpO2 of 93.6%, with a minimum oxygen saturation of 86.0%.  Oxygen saturations were less than or = 89% for 1.6 minutes of sleep time.     CARDIAC: The highest heart rate during the recording was 90.0 beats per minute.  The average heart rate during sleep was 56.4 bpm.     LIMB MOVEMENTS: There were a total of 1 PLMs during sleep, of which 0 were PLMs arousals.  This resulted in a PLMS index of 0.1.     CPAP was tried from 5 to 13cm H2O.    The sleep efficiency on CPAP therapy was 96%. Sleep architecture showed reduced stage III sleep, with stage I: 5%, stage 2:66%, stage III: 4%, and REM sleep: 25%, of the night. There were 5 REM cycles noted throughout the night. Sleep latency was reduced at 4.7 minutes. No EKG or EMG abnormalities were appreciated.    CPAP therapy was initiated at 5 cm of water, titrated up to 13 cm of water, with resultant AHI of 0 and mean oximetry at 94%.    Interpretation:  Successful CPAP titration at 13 cm of water, with resolved CASEY and  hypoxia.  Excellent sleep efficiency and REM rebound on CPAP therapy.    Recommendations:  CPAP: 13 cm of water using a medium Eson mask with heated humidification.  Weight loss recommended secondary to BMI of 36.  Avoidance of alcohol, sedatives, and muscle relaxants  advised as these can exacerbate sleep disordered breathing.

## 2017-08-01 ENCOUNTER — SLEEP CENTER VISIT (OUTPATIENT)
Dept: SLEEP MEDICINE | Facility: MEDICAL CENTER | Age: 37
End: 2017-08-01
Payer: COMMERCIAL

## 2017-08-01 VITALS
OXYGEN SATURATION: 60 % | HEART RATE: 98 BPM | SYSTOLIC BLOOD PRESSURE: 122 MMHG | BODY MASS INDEX: 34.93 KG/M2 | WEIGHT: 244 LBS | DIASTOLIC BLOOD PRESSURE: 80 MMHG | RESPIRATION RATE: 15 BRPM | HEIGHT: 70 IN

## 2017-08-01 DIAGNOSIS — J30.9 ALLERGIC RHINITIS, UNSPECIFIED ALLERGIC RHINITIS TRIGGER, UNSPECIFIED RHINITIS SEASONALITY: ICD-10-CM

## 2017-08-01 DIAGNOSIS — G47.33 OSA (OBSTRUCTIVE SLEEP APNEA): ICD-10-CM

## 2017-08-01 PROCEDURE — 99213 OFFICE O/P EST LOW 20 MIN: CPT | Performed by: NURSE PRACTITIONER

## 2017-08-01 NOTE — PATIENT INSTRUCTIONS
1. Initiate CPAP, Order to preferred home care. You should hear from preferred home care in 7-10 business days. If you do not hear from them, please call [phone] and ask to speak with DME coordinator.   2. Follow up in 6 weeks to review compliance download, sooner if needed  3. Trial of Nasonex or Flonase recommended for rhinitis

## 2017-08-01 NOTE — MR AVS SNAPSHOT
"        Kapil Small   2017 8:20 AM   Sleep Center Visit   MRN: [id number]    Department:  [facility]   Dept Phone:  [phone]    Description:  Male : 1980   Provider:  TESSA Rudd           Reason for Visit     Results SS      Allergies as of 2017     Allergen Noted Reactions    Pcn [Penicillins] 2017   Hives      You were diagnosed with     CASEY (obstructive sleep apnea)   [095223]       BMI 35.0-35.9,adult   [788444]         Vital Signs     Blood Pressure Pulse Respirations Height Weight Body Mass Index    122/80 mmHg 98 15 1.778 m (5' 10\") 110.678 kg (244 lb) 35.01 kg/m2    Oxygen Saturation Smoking Status                60% Former Smoker          Basic Information     Date Of Birth Sex Race Ethnicity Preferred Language    1980 Male White Unknown English      Your appointments     Oct 06, 2017  9:00 AM   Follow UP with TESSA Rudd   [facility] (--)    [address] [place] [address]   [phone]              Problem List              ICD-10-CM Priority Class Noted - Resolved    CASEY (obstructive sleep apnea) G47.33   2017 - Present    BMI 35.0-35.9,adult Z68.35   2017 - Present    Allergic rhinitis J30.9   2017 - Present      Health Maintenance        Date Due Completion Dates    IMM DTaP/Tdap/Td Vaccine (1 - Tdap) 1999 ---    IMM INFLUENZA (1) 2017 ---            Current Immunizations     No immunizations on file.      Below and/or attached are the medications your provider expects you to take. Review all of your home medications and newly ordered medications with your provider and/or pharmacist. Follow medication instructions as directed by your provider and/or pharmacist. Please keep your medication list with you and share with your provider. Update the information when medications are discontinued, doses are changed, or new medications (including over-the-counter " products) are added; and carry medication information at all times in the event of emergency situations     Allergies:  PCN - Hives               Medications  Valid as of: August 01, 2017 -  8:37 AM    Generic Name Brand Name Tablet Size Instructions for use    Neomycin-Polymyxin-HC (Solution) NEOMYCIN-POLYMYXIN-HC (OTIC) 1 % Place 4 Drops in ear 4 times a day.        Pseudoephedrine HCl (TABLET SR 12 HR) SUDAFED  MG Take 120 mg by mouth 2 times a day as needed for Congestion. Indications: Cold Symptoms        .                 Medicines prescribed today were sent to:     [address], [place] [address] [facility]    [address] [facility] [place] [address]    Phone: [phone] Fax: [phone]    Open 24 Hours?: No      Medication refill instructions:       If your prescription bottle indicates you have medication refills left, it is not necessary to call your provider’s office. Please contact your pharmacy and they will refill your medication.    If your prescription bottle indicates you do not have any refills left, you may request refills at any time through one of the following ways: The online BULX system (except Urgent Care), by calling your provider’s office, or by asking your pharmacy to contact your provider’s office with a refill request. Medication refills are processed only during regular business hours and may not be available until the next business day. Your provider may request additional information or to have a follow-up visit with you prior to refilling your medication.   *Please Note: Medication refills are assigned a new Rx number when refilled electronically. Your pharmacy may indicate that no refills were authorized even though a new prescription for the same medication is available at the pharmacy. Please request the medicine by name with the pharmacy before contacting your provider for a refill.        Instructions    1. Initiate CPAP, Order to preferred  home care. You should hear from preferred home care in 7-10 business days. If you do not hear from them, please call [phone] and ask to speak with DME coordinator.   2. Follow up in 6 weeks to review compliance download, sooner if needed            Perpetuall Access Code: [email or web address]  Expires: 8/31/2017  8:09 AM    Perpetuall  A secure, online tool to manage your health information     [employer]’s Perpetuall® is a secure, online tool that connects you to your personalized health information from the privacy of your home -- day or night - making it very easy for you to manage your healthcare. Once the activation process is completed, you can even access your medical information using the Perpetuall binh, which is available for free in the Apple Binh store or Google Play store.     Perpetuall provides the following levels of access (as shown below):   My Chart Features   [facility] Primary Care Doctor [facility]  Specialists [facility]  Urgent  Care [facility]  Primary Care  Doctor   Email your healthcare team securely and privately 24/7 X X X    Manage appointments: schedule your next appointment; view details of past/upcoming appointments X      Request prescription refills. X      View recent personal medical records, including lab and immunizations X X X X   View health record, including health history, allergies, medications X X X X   Read reports about your outpatient visits, procedures, consult and ER notes X X X X   See your discharge summary, which is a recap of your hospital and/or ER visit that includes your diagnosis, lab results, and care plan. X X       How to register for Perpetuall:  1. Go to  [email or web address].  2. Click on the Sign Up Now box, which takes you to the New Member Sign Up page. You will need to provide the following information:  a. Enter your Perpetuall Access Code exactly as it appears at the top of this page. (You will not need to use this code after you’ve completed the sign-up process. If you do  not sign up before the expiration date, you must request a new code.)   b. Enter your date of birth.   c. Enter your home email address.   d. Click Submit, and follow the next screen’s instructions.  3. Create a SyndicateRoom ID. This will be your SyndicateRoom login ID and cannot be changed, so think of one that is secure and easy to remember.  4. Create a ParaShoott password. You can change your password at any time.  5. Enter your Password Reset Question and Answer. This can be used at a later time if you forget your password.   6. Enter your e-mail address. This allows you to receive e-mail notifications when new information is available in SyndicateRoom.  7. Click Sign Up. You can now view your health information.    For assistance activating your SyndicateRoom account, call [phone]

## 2017-08-01 NOTE — PROGRESS NOTES
"Chief Complaint   Patient presents with   • Results     SS         HPI: This patient is a 36 y.o. male, who presents for titration results. Medical history includes BMI 35.    In regards to CASEY, HSS indicates severe sleep apnea with an AHI of 45, minimum oxygen saturation of 75 %. He returned for titration study. He was successfully titrated to CPAP 12 cm H2O. On this he achieved 37 minutes of REM, AHI was reduced to 5.6, mean saturation 94.1%. Testing reviewed in detail with the patient. He is amenable to initiating therapy. Sleep symptoms include snoring, witnessed apneas, excessive daytime somnolence. He also notes rhinitis. Denies other allergy symptoms. He reports he is constantly \"clearing my throat\" he denies dyspnea, cough or wheeze. 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    Past Medical History   Diagnosis Date   • Chickenpox        Social History   Substance Use Topics   • Smoking status: Former Smoker -- 0.50 packs/day for 18 years     Types: Cigarettes     Quit date: 03/01/2017   • Smokeless tobacco: Never Used   • Alcohol Use: None      Comment: 1 drink almost daily       Family History   Problem Relation Age of Onset   • Heart Disease Father    • Sleep Apnea Neg Hx    • Hypertension Neg Hx    • Respiratory Disease Neg Hx    • Asthma Neg Hx    • Cancer Neg Hx        Current medications as of today   Current Outpatient Prescriptions   Medication Sig Dispense Refill   • pseudoephedrine SR (SUDAFED 12 HOUR) 120 MG TABLET SR 12 HR Take 120 mg by mouth 2 times a day as needed for Congestion. Indications: Cold Symptoms     • NEOMYCIN-POLYMYXIN-HC, OTIC, 1 % Solution Place 4 Drops in ear 4 times a day. 1 Bottle 0     No current facility-administered medications for this visit.       Allergies: Pcn    Blood pressure 122/80, pulse 98, resp. rate 15, height 1.778 m (5' 10\"), weight 110.678 kg (244 lb), SpO2 60 %.      ROS:   Constitutional: " Denies fevers, chills, night sweats, weight loss. positive fatigue.  Eyes: Denies pain, discharge/drainage  ENT: Denies tinnitus, hearing loss, sinusitis, hoarseness, epistaxis.   Allergic: Denies  hayfever. Positive rhinitis.  Respiratory: Denies cough, wheeze, dyspnea, hemoptysis  Cardiovascular: Denies chest pain, tightness, palpitations, orthopnea or edema  Sleep: See HPI  GI/: Denies heartburn, nausea, vomiting, urinary incontinence, hematuria  Musculoskeletal: Denies back pain, painful joints, sore muscles  Neurological: Denies vertigo or headaches  Skin: Denies rashes, lesions  Psychiatric: Denies depression or anxiety    Physical exam:   Constitutional: Well-nourished, well-developed, in no acute distress  Eyes: PERRLA  Mouth/Throat: Oropharynx is moist, clear, no lesions, Mallampati 4  Neck: supple, no masses  Respiratory: no intercostal retractions or accessory muscle use   Lungs auscultation: Clear to auscultation bilaterally  Cardiovascular: Regular rate rhythm no murmurs, rubs or gallops  Musculoskeletal: Normal gait, no clubbing or cyanosis  Skin: No rashes or lesions  Neuro: No focal deficit, cranial nerves grossly intact  Psychiatric: Oriented to time, person and place.     Diagnosis:  1. CASEY (obstructive sleep apnea)  DME CPAP   2. BMI 35.0-35.9,adult     3. Allergic rhinitis, unspecified allergic rhinitis trigger, unspecified rhinitis seasonality         Plan:    1. Initiate CPAP, Order to preferred home care. You should hear from preferred home care in 7-10 business days. If you do not hear from them, please call [phone] and ask to speak with DME coordinator.   2. Follow up in 6 weeks to review compliance download, sooner if needed  3. Trial of Nasonex or Flonase recommended for rhinitis

## 2017-10-09 ENCOUNTER — OFFICE VISIT (OUTPATIENT)
Dept: URGENT CARE | Facility: PHYSICIAN GROUP | Age: 37
End: 2017-10-09
Payer: COMMERCIAL

## 2017-10-09 VITALS
HEART RATE: 59 BPM | TEMPERATURE: 97.8 F | WEIGHT: 254 LBS | SYSTOLIC BLOOD PRESSURE: 144 MMHG | OXYGEN SATURATION: 97 % | BODY MASS INDEX: 36.36 KG/M2 | RESPIRATION RATE: 16 BRPM | DIASTOLIC BLOOD PRESSURE: 82 MMHG | HEIGHT: 70 IN

## 2017-10-09 DIAGNOSIS — K12.2 ORAL INFECTION: ICD-10-CM

## 2017-10-09 PROCEDURE — 99214 OFFICE O/P EST MOD 30 MIN: CPT | Performed by: PHYSICIAN ASSISTANT

## 2017-10-09 RX ORDER — CLINDAMYCIN HYDROCHLORIDE 300 MG/1
300 CAPSULE ORAL 4 TIMES DAILY
Qty: 40 CAP | Refills: 0 | Status: SHIPPED | OUTPATIENT
Start: 2017-10-09 | End: 2017-10-19

## 2017-10-09 ASSESSMENT — ENCOUNTER SYMPTOMS
FEVER: 0
CHILLS: 0
SORE THROAT: 0

## 2017-10-09 NOTE — PROGRESS NOTES
"Subjective:      Kapil Small is a 37 y.o. male who presents with Oral Swelling (x3days Pt states he was eating when something stabbed under his tounge, swelling)            Patient reports that he was eating chips and salsa 3 days ago when he felt something poke the inside of his mouth. Since then he has had pain and swelling which has been fluctuating. He is concerned about infection. No fevers, chills, or other complaints.      Oral Pain   This is a new problem. The current episode started in the past 7 days. The problem occurs constantly. The problem has been waxing and waning. Pertinent negatives include no chills, congestion, fever or sore throat. The symptoms are aggravated by eating and drinking. He has tried NSAIDs for the symptoms. The treatment provided mild relief.       Review of Systems   Constitutional: Negative for chills and fever.   HENT: Negative for congestion, ear pain and sore throat.      Allergies:Pcn [penicillins]    Current Outpatient Prescriptions Ordered in [facility]   Medication Sig Dispense Refill   • clindamycin (CLEOCIN) 300 MG Cap Take 1 Cap by mouth 4 times a day for 10 days. 40 Cap 0     No current Epic-ordered facility-administered medications on file.        Past Medical History:   Diagnosis Date   • Chickenpox        Social History   Substance Use Topics   • Smoking status: Former Smoker     Packs/day: 0.50     Years: 18.00     Types: Cigarettes     Quit date: 3/1/2017   • Smokeless tobacco: Never Used   • Alcohol use No      Comment: 1 drink almost daily       Family Status   Relation Status   • Father    • Neg Hx      Family History   Problem Relation Age of Onset   • Heart Disease Father    • Sleep Apnea Neg Hx    • Hypertension Neg Hx    • Respiratory Disease Neg Hx    • Asthma Neg Hx    • Cancer Neg Hx             Objective:     /82   Pulse (!) 59   Temp 36.6 °C (97.8 °F)   Resp 16   Ht 1.778 m (5' 10\")   Wt 115.2 kg (254 lb)   SpO2 97%   BMI 36.45 kg/m²  "     Physical Exam   Constitutional: He is oriented to person, place, and time. He appears well-developed and well-nourished. No distress.   HENT:   Head: Normocephalic and atraumatic.   Right Ear: External ear normal.   Left Ear: External ear normal.   Mouth/Throat: Uvula is midline and oropharynx is clear and moist.       Eyes: Right eye exhibits no discharge. Left eye exhibits no discharge.   Neck: Normal range of motion. Neck supple.   Cardiovascular: Normal rate.    Pulmonary/Chest: Effort normal.   Neurological: He is alert and oriented to person, place, and time.   Skin: Skin is warm and dry. He is not diaphoretic.   Psychiatric: He has a normal mood and affect. His behavior is normal. Judgment and thought content normal.   Nursing note and vitals reviewed.              Assessment/Plan:     1. Oral infection  clindamycin (CLEOCIN) 300 MG Cap    Mild tenderness and swelling. Suspect infection. Start clindamycin. Follow-up with PCP/dentist if not improving.           Please note that this dictation was created using voice recognition software. I have made every reasonable attempt to correct obvious errors, but I expect that there are errors of grammar and possibly content that I did not discover before finalizing the note.

## 2017-12-28 ENCOUNTER — SLEEP CENTER VISIT (OUTPATIENT)
Dept: SLEEP MEDICINE | Facility: MEDICAL CENTER | Age: 37
End: 2017-12-28
Payer: COMMERCIAL

## 2017-12-28 VITALS
HEIGHT: 70 IN | SYSTOLIC BLOOD PRESSURE: 132 MMHG | RESPIRATION RATE: 15 BRPM | DIASTOLIC BLOOD PRESSURE: 80 MMHG | WEIGHT: 254 LBS | BODY MASS INDEX: 36.36 KG/M2 | HEART RATE: 70 BPM | OXYGEN SATURATION: 92 %

## 2017-12-28 DIAGNOSIS — G47.33 OSA (OBSTRUCTIVE SLEEP APNEA): ICD-10-CM

## 2017-12-28 PROCEDURE — 99213 OFFICE O/P EST LOW 20 MIN: CPT | Performed by: NURSE PRACTITIONER

## 2017-12-28 NOTE — PROGRESS NOTES
"Chief Complaint   Patient presents with   • Follow-Up     6 WKS         HPI: This patient is a 37 y.o. male, who presents for compliance check and follow-up CASEY.      In regards to CASEY, HSS indicates severe sleep apnea with an AHI of 45, minimum oxygen saturation of 75 %. He returned for titration study. He was successfully titrated to CPAP 12 cm H2O. He was initiated on this at his last visit in August. Compliance report shows 93% compliance over the past 30 days, average use of 4 hours 21 minutes per night, AHI has been reduced to 1.3. He works very long hours. Only getting 4-5 hours of sleep per night. He does however feel he has benefited from therapy. He sleeps uninterrupted at night, wakes feeling rested. Denies AM headache. He is using nasal pillows and nasal mask which are both comfortable. Denies changes to his health since he was last seen.    Past Medical History:   Diagnosis Date   • Chickenpox        Social History   Substance Use Topics   • Smoking status: Former Smoker     Packs/day: 0.50     Years: 18.00     Types: Cigarettes     Quit date: 3/1/2017   • Smokeless tobacco: Never Used   • Alcohol use No      Comment: 1 drink almost daily       Family History   Problem Relation Age of Onset   • Heart Disease Father    • Sleep Apnea Neg Hx    • Hypertension Neg Hx    • Respiratory Disease Neg Hx    • Asthma Neg Hx    • Cancer Neg Hx        Current medications as of today   No current outpatient prescriptions on file.     No current facility-administered medications for this visit.        Allergies: Pcn [penicillins]    Blood pressure 132/80, pulse 70, resp. rate 15, height 1.778 m (5' 10\"), weight 115.2 kg (254 lb), SpO2 92 %.      ROS:   Constitutional: Denies fevers, chills, night sweats, weight loss or fatigue  Eyes: Denies pain, discharge/drainage  Allergic:Positive   Respiratory: Denies cough, wheeze, dyspnea, hemoptysis  Cardiovascular: Denies chest pain, tightness, palpitations, orthopnea or " edema  Sleep: See HPI  Neurological: Denies vertigo or headaches  Skin: Denies rashes, lesions  Psychiatric: Denies depression or anxiety    Physical exam:   Constitutional: Well-nourished, well-developed, in no acute distress  Eyes: PERRL  Neck: supple, no masses  Respiratory: no intercostal retractions or accessory muscle use   Lungs auscultation: Clear to auscultation bilaterally  Cardiovascular: Regular rate rhythm no murmurs, rubs or gallops  Musculoskeletal: Normal gait, no clubbing or cyanosis  Skin: No rashes or lesions noted on exposed skin  Neuro: No focal deficit noted  Psychiatric: Oriented to time, person and place.     Diagnosis:  1. CASEY (obstructive sleep apnea)         Plan:  1. Continue CPAP nightly  2. Clean mask and tubing weekly  3. Replace supplies as insurance will allow  4. Follow up annually

## 2017-12-28 NOTE — PATIENT INSTRUCTIONS
1. Continue CPAP nightly  2. Clean mask and tubing weekly  3. Replace supplies as insurance will allow  4. Follow up annually

## 2018-01-03 ENCOUNTER — TELEPHONE (OUTPATIENT)
Dept: PULMONOLOGY | Facility: HOSPICE | Age: 38
End: 2018-01-03

## 2018-01-03 DIAGNOSIS — G47.33 OBSTRUCTIVE SLEEP APNEA: ICD-10-CM

## 2018-01-03 NOTE — TELEPHONE ENCOUNTER
Pt requesting order for mask and supplies to      [phone]   [facility]       Send with justin, DEISI and current notes    Pt last seen by Leola but needs tubing ASAP and requests this be sent today if possible

## 2018-04-05 ENCOUNTER — OFFICE VISIT (OUTPATIENT)
Dept: URGENT CARE | Facility: PHYSICIAN GROUP | Age: 38
End: 2018-04-05
Payer: COMMERCIAL

## 2018-04-05 ENCOUNTER — APPOINTMENT (OUTPATIENT)
Dept: RADIOLOGY | Facility: IMAGING CENTER | Age: 38
End: 2018-04-05
Attending: FAMILY MEDICINE
Payer: COMMERCIAL

## 2018-04-05 VITALS
TEMPERATURE: 97.1 F | HEIGHT: 70 IN | HEART RATE: 52 BPM | WEIGHT: 248 LBS | DIASTOLIC BLOOD PRESSURE: 88 MMHG | RESPIRATION RATE: 16 BRPM | SYSTOLIC BLOOD PRESSURE: 130 MMHG | OXYGEN SATURATION: 98 % | BODY MASS INDEX: 35.5 KG/M2

## 2018-04-05 DIAGNOSIS — E66.9 OBESITY (BMI 35.0-39.9 WITHOUT COMORBIDITY): ICD-10-CM

## 2018-04-05 DIAGNOSIS — S20.211A CONTUSION OF RIB ON RIGHT SIDE, INITIAL ENCOUNTER: ICD-10-CM

## 2018-04-05 PROCEDURE — 71101 X-RAY EXAM UNILAT RIBS/CHEST: CPT | Mod: TC,FY,RT | Performed by: FAMILY MEDICINE

## 2018-04-05 PROCEDURE — 99214 OFFICE O/P EST MOD 30 MIN: CPT | Performed by: FAMILY MEDICINE

## 2018-04-05 NOTE — PROGRESS NOTES
Chief Complaint:    Chief Complaint   Patient presents with   • Rib Injury       History of Present Illness:    This is a new problem. Has pain right anterolateral mid-lower ribs region x 2 days. He tripped and impacted this area on a cooler 2 days ago. Past few days, pain is worse than initially, but now pain is overall staying same. Took Ibuprofen with mild temporary help.      Review of Systems:    Constitutional: Negative for fever, chills, and diaphoresis.   Eyes: Negative for change in vision, photophobia, pain, redness, and discharge.  ENT: Negative for ear pain, ear discharge, hearing loss, tinnitus, nasal congestion, nosebleeds, and sore throat.    Respiratory: Negative for cough, hemoptysis, sputum production, shortness of breath, wheezing, and stridor.    Cardiovascular: Negative for chest pain, palpitations, orthopnea, claudication, leg swelling, and PND.   Gastrointestinal: Negative for abdominal pain, nausea, vomiting, diarrhea, constipation, blood in stool, and melena.   Genitourinary: Negative for dysuria, urinary urgency, urinary frequency, hematuria, and flank pain.   Musculoskeletal: See HPI.  Skin: Negative for rash and itching.   Neurological: Negative for dizziness, tingling, tremors, sensory change, speech change, focal weakness, seizures, loss of consciousness, and headaches.   Endo: Negative for polydipsia.   Heme: Does not bruise/bleed easily.   Psychiatric/Behavioral: Negative for depression, suicidal ideas, hallucinations, memory loss and substance abuse. The patient is not nervous/anxious and does not have insomnia.        Past Medical History:    Past Medical History:   Diagnosis Date   • Chickenpox      Past Surgical History:    Past Surgical History:   Procedure Laterality Date   • DENTAL EXTRACTION(S)      [place] [facility]     Social History:    Social History     Social History   • Marital status:      Spouse name: N/A   • Number of children: N/A   • Years of education: N/A  "    Occupational History   • Not on file.     Social History Main Topics   • Smoking status: Former Smoker     Packs/day: 0.50     Years: 18.00     Types: Cigarettes     Quit date: 3/1/2017   • Smokeless tobacco: Never Used   • Alcohol use No      Comment: 1 drink almost daily   • Drug use: No      Comment: Rarely (recreationally/medicinally)   • Sexual activity: Not on file     Other Topics Concern   • Not on file     Social History Narrative   • No narrative on file     Family History:    Family History   Problem Relation Age of Onset   • Heart Disease Father    • Sleep Apnea Neg Hx    • Hypertension Neg Hx    • Respiratory Disease Neg Hx    • Asthma Neg Hx    • Cancer Neg Hx      Medications:    No current outpatient prescriptions on file prior to visit.     No current facility-administered medications on file prior to visit.      Allergies:    Allergies   Allergen Reactions   • Pcn [Penicillins] Hives       Vitals:    Vitals:    04/05/18 0908   BP: 130/88   Pulse: (!) 52   Resp: 16   Temp: 36.2 °C (97.1 °F)   SpO2: 98%   Weight: 112.5 kg (248 lb)   Height: 1.778 m (5' 10\")       Physical Exam:    Constitutional: Vital signs reviewed. Appears well-developed and well-nourished. No acute distress.   Eyes: Sclera white, conjunctivae clear.   ENT: External ears normal. Hearing normal.   Neck: Neck supple.   Cardiovascular: Regular rate and rhythm. No murmur.  Pulmonary/Chest: Respirations non-labored. Clear to auscultation bilaterally.  Musculoskeletal: Tender to palpation in right anterolateral mid-lower ribs region without external bruising. Normal gait. Normal range of motion. No muscular atrophy or weakness.  Neurological: Alert and oriented to person, place, and time. Muscle tone normal. Coordination normal.   Skin: No rashes or lesions. Warm, dry, normal turgor.  Psychiatric: Normal mood and affect. Behavior is normal. Judgment and thought content normal.     Diagnostics:    [id number] (WITH 1-VIEW CXR) " (Order #[id number]) on 4/5/18   Narrative       4/5/2018 9:24 AM    HISTORY/REASON FOR EXAM: Right anterior rib pain.    TECHNIQUE/EXAM DESCRIPTION AND NUMBER OF VIEWS:  5 images of the right ribs and chest.    COMPARISON: NONE    FINDINGS:  No fractures or acute bony changes are noted.  There is no evidence of a hemo or pneumothorax.   Impression       Negative right rib series.     Images and Rad report reviewed with him and copies to him.      Assessment / Plan:    1. Contusion of rib on right side, initial encounter  - [id number] (WITH 1-VIEW CXR) RIGHT; Future    2. Obesity (BMI 35.0-39.9 without comorbidity)  - Patient identified as having weight management issue.  Appropriate orders and counseling given.      Discussed with him DDX and management options.    Advised problem will have to resolve with time.    Rec'd relative rest.    May take over-the-counter Ibuprofen (Motrin or Advil) OR Naproxen (Aleve) as needed for pain for anti-inflammatory effect.    May take over-the-counter Acetaminophen (Tylenol) as needed for pain.    Follow-up with PCP or urgent care if getting worse or not better with time and above.

## 2019-09-26 ENCOUNTER — OFFICE VISIT (OUTPATIENT)
Dept: URGENT CARE | Facility: CLINIC | Age: 39
End: 2019-09-26
Payer: COMMERCIAL

## 2019-09-26 VITALS
HEART RATE: 86 BPM | BODY MASS INDEX: 37.58 KG/M2 | HEIGHT: 69 IN | TEMPERATURE: 98 F | RESPIRATION RATE: 16 BRPM | WEIGHT: 253.75 LBS | OXYGEN SATURATION: 97 % | DIASTOLIC BLOOD PRESSURE: 90 MMHG | SYSTOLIC BLOOD PRESSURE: 140 MMHG

## 2019-09-26 DIAGNOSIS — R09.82 PND (POST-NASAL DRIP): ICD-10-CM

## 2019-09-26 DIAGNOSIS — J02.9 PHARYNGITIS, UNSPECIFIED ETIOLOGY: ICD-10-CM

## 2019-09-26 DIAGNOSIS — R09.81 NASAL CONGESTION WITH RHINORRHEA: ICD-10-CM

## 2019-09-26 DIAGNOSIS — J02.9 SORE THROAT: ICD-10-CM

## 2019-09-26 DIAGNOSIS — J34.89 NASAL CONGESTION WITH RHINORRHEA: ICD-10-CM

## 2019-09-26 LAB
INT CON NEG: NORMAL
INT CON POS: NORMAL
S PYO AG THROAT QL: NORMAL

## 2019-09-26 PROCEDURE — 99214 OFFICE O/P EST MOD 30 MIN: CPT | Performed by: NURSE PRACTITIONER

## 2019-09-26 PROCEDURE — 87880 STREP A ASSAY W/OPTIC: CPT | Performed by: NURSE PRACTITIONER

## 2019-09-26 RX ORDER — FLUTICASONE PROPIONATE 50 MCG
2 SPRAY, SUSPENSION (ML) NASAL DAILY
Qty: 1 BOTTLE | Refills: 0 | Status: SHIPPED | OUTPATIENT
Start: 2019-09-26 | End: 2021-04-29

## 2019-09-26 RX ORDER — AZITHROMYCIN 250 MG/1
TABLET, FILM COATED ORAL
Qty: 6 TAB | Refills: 0 | Status: SHIPPED | OUTPATIENT
Start: 2019-09-26 | End: 2021-04-29

## 2019-09-26 SDOH — HEALTH STABILITY: MENTAL HEALTH: HOW MANY STANDARD DRINKS CONTAINING ALCOHOL DO YOU HAVE ON A TYPICAL DAY?: 3 OR 4

## 2019-09-26 ASSESSMENT — ENCOUNTER SYMPTOMS
FEVER: 0
ABDOMINAL PAIN: 0
DIARRHEA: 0
COUGH: 0
CONSTIPATION: 0
WEAKNESS: 0
NAUSEA: 0
NECK PAIN: 0
VOMITING: 0
DIZZINESS: 0
SORE THROAT: 1
CHILLS: 0
SHORTNESS OF BREATH: 0
HEADACHES: 0
SINUS PAIN: 0
EYE DISCHARGE: 0
WHEEZING: 0
MYALGIAS: 0
EYE REDNESS: 0

## 2019-09-26 NOTE — PROGRESS NOTES
"Subjective:      Kapil Small is a 39 y.o. male who presents with Pharyngitis (No fever, runny nose, cough, headache or nausea)            HPI  C/o sore throat. Denies fever, nasal congestion or runny nose. No cough or sinus facial pressure. Denies n/v or abdominal pain. Nyquil last night. States does have chronic sinus issues.Does not use nasal spray or saline rinse. No salt water gargle.    PMH:  has a past medical history of Chickenpox.  MEDS: No current outpatient medications on file.  ALLERGIES:   Allergies   Allergen Reactions   • Pcn [Penicillins] Hives     SURGHX:   Past Surgical History:   Procedure Laterality Date   • DENTAL EXTRACTION(S)      [place] [facility]     SOCHX:  reports that he has been smoking cigarettes. He has a 9.00 pack-year smoking history. He has never used smokeless tobacco. He reports that he drinks about 1.8 oz of alcohol per week. He reports that he does not use drugs.  FH: Family history was reviewed, no pertinent findings to report    Review of Systems   Constitutional: Negative for chills, fever and malaise/fatigue.   HENT: Positive for congestion and sore throat. Negative for ear pain and sinus pain.    Eyes: Negative for discharge and redness.   Respiratory: Negative for cough, shortness of breath and wheezing.    Gastrointestinal: Negative for abdominal pain, constipation, diarrhea, nausea and vomiting.   Musculoskeletal: Negative for myalgias and neck pain.   Skin: Negative for itching and rash.   Neurological: Negative for dizziness, weakness and headaches.   Endo/Heme/Allergies: Positive for environmental allergies.   All other systems reviewed and are negative.         Objective:     /90   Pulse 86   Temp 36.7 °C (98 °F) (Temporal)   Resp 16   Ht 1.753 m (5' 9\")   Wt 115.1 kg (253 lb 12 oz)   SpO2 97%   BMI 37.47 kg/m²      Physical Exam   Constitutional: He is oriented to person, place, and time. Vital signs are normal. He appears well-developed and " well-nourished. He is active and cooperative.  Non-toxic appearance. He does not have a sickly appearance. He does not appear ill. No distress.   HENT:   Head: Normocephalic.   Right Ear: External ear and ear canal normal. A middle ear effusion is present.   Left Ear: External ear and ear canal normal. A middle ear effusion is present.   Nose: Mucosal edema and rhinorrhea present. No sinus tenderness.   Mouth/Throat: Uvula is midline. Mucous membranes are dry. No uvula swelling. Posterior oropharyngeal erythema present. No posterior oropharyngeal edema.   Eyes: Pupils are equal, round, and reactive to light. Conjunctivae and EOM are normal.   Neck: Normal range of motion. Neck supple.   Cardiovascular: Normal rate and regular rhythm.   Pulmonary/Chest: Effort normal and breath sounds normal. No accessory muscle usage or stridor. No respiratory distress. He has no decreased breath sounds. He has no wheezes. He has no rhonchi. He has no rales.   Musculoskeletal: Normal range of motion.   Lymphadenopathy:     He has no cervical adenopathy.   Neurological: He is alert and oriented to person, place, and time.   Skin: Skin is warm and dry. He is not diaphoretic.   Vitals reviewed.              Assessment/Plan:     1. Sore throat    - POCT Rapid Strep A: NEG    2. Pharyngitis, unspecified etiology    - azithromycin (ZITHROMAX) 250 MG Tab; Take 2 tabs by mouth on day 1, then take 1 tab on days 2-5  Dispense: 6 Tab; Refill: 0    3. Nasal congestion with rhinorrhea  - fluticasone (FLONASE) 50 MCG/ACT nasal spray; Spray 2 Sprays in nose every day.  Dispense: 1 Bottle; Refill: 0    4. PND (post-nasal drip)    - fluticasone (FLONASE) 50 MCG/ACT nasal spray; Spray 2 Sprays in nose every day.  Dispense: 1 Bottle; Refill: 0    Increase water intake  Get rest  May use Ibuprofen/Tylenol prn for any fever, body aches or throat pain  May take longer acting antihistamine for seasonal allergy symptoms prn  May use saline nasal spray for  nasal congestion  May use Flonase or Nasocort for allergen nasal congestion  May gargle with salt water prn for throat discomfort  May drink smoothies for nutrition if too painful to swallow solid foods  Monitor for productive cough, SOB and chest pain/tightness- need re-evaluation

## 2019-11-21 ENCOUNTER — OFFICE VISIT (OUTPATIENT)
Dept: URGENT CARE | Facility: PHYSICIAN GROUP | Age: 39
End: 2019-11-21
Payer: COMMERCIAL

## 2019-11-21 VITALS
HEIGHT: 69 IN | HEART RATE: 68 BPM | TEMPERATURE: 97.2 F | DIASTOLIC BLOOD PRESSURE: 98 MMHG | SYSTOLIC BLOOD PRESSURE: 132 MMHG | OXYGEN SATURATION: 98 % | BODY MASS INDEX: 37.92 KG/M2 | RESPIRATION RATE: 16 BRPM | WEIGHT: 256 LBS

## 2019-11-21 DIAGNOSIS — M54.16 LUMBAR RADICULOPATHY: ICD-10-CM

## 2019-11-21 PROCEDURE — 99214 OFFICE O/P EST MOD 30 MIN: CPT | Performed by: PHYSICIAN ASSISTANT

## 2019-11-21 RX ORDER — METHYLPREDNISOLONE 4 MG/1
4 TABLET ORAL SEE ADMIN INSTRUCTIONS
Qty: 21 TAB | Refills: 0 | Status: SHIPPED | OUTPATIENT
Start: 2019-11-21 | End: 2021-04-29

## 2019-11-21 RX ORDER — IBUPROFEN 800 MG/1
800 TABLET ORAL EVERY 8 HOURS PRN
Qty: 30 TAB | Refills: 0 | Status: SHIPPED | OUTPATIENT
Start: 2019-11-21 | End: 2021-04-29

## 2019-11-21 NOTE — PATIENT INSTRUCTIONS
Steps to Quit Smoking  Smoking tobacco can be harmful to your health and can affect almost every organ in your body. Smoking puts you, and those around you, at risk for developing many serious chronic diseases. Quitting smoking is difficult, but it is one of the best things that you can do for your health. It is never too late to quit.  What are the benefits of quitting smoking?  When you quit smoking, you lower your risk of developing serious diseases and conditions, such as:  · Lung cancer or lung disease, such as COPD.  · Heart disease.  · Stroke.  · Heart attack.  · Infertility.  · Osteoporosis and bone fractures.  Additionally, symptoms such as coughing, wheezing, and shortness of breath may get better when you quit. You may also find that you get sick less often because your body is stronger at fighting off colds and infections. If you are pregnant, quitting smoking can help to reduce your chances of having a baby of low birth weight.  How do I get ready to quit?  When you decide to quit smoking, create a plan to make sure that you are successful. Before you quit:  · Pick a date to quit. Set a date within the next two weeks to give you time to prepare.  · Write down the reasons why you are quitting. Keep this list in places where you will see it often, such as on your bathroom mirror or in your car or wallet.  · Identify the people, places, things, and activities that make you want to smoke (triggers) and avoid them. Make sure to take these actions:  ¨ Throw away all cigarettes at home, at work, and in your car.  ¨ Throw away smoking accessories, such as ashtrays and lighters.  ¨ Clean your car and make sure to empty the ashtray.  ¨ Clean your home, including curtains and carpets.  · Tell your family, friends, and coworkers that you are quitting. Support from your loved ones can make quitting easier.  · Talk with your health care provider about your options for quitting smoking.  · Find out what treatment  options are covered by your health insurance.  What strategies can I use to quit smoking?  Talk with your healthcare provider about different strategies to quit smoking. Some strategies include:  · Quitting smoking altogether instead of gradually lessening how much you smoke over a period of time. Research shows that quitting “cold turkey” is more successful than gradually quitting.  · Attending in-person counseling to help you build problem-solving skills. You are more likely to have success in quitting if you attend several counseling sessions. Even short sessions of 10 minutes can be effective.  · Finding resources and support systems that can help you to quit smoking and remain smoke-free after you quit. These resources are most helpful when you use them often. They can include:  ¨ Online chats with a counselor.  ¨ Telephone quitlines.  ¨ Printed self-help materials.  ¨ Support groups or group counseling.  ¨ Text messaging programs.  ¨ Mobile phone applications.  · Taking medicines to help you quit smoking. (If you are pregnant or breastfeeding, talk with your health care provider first.) Some medicines contain nicotine and some do not. Both types of medicines help with cravings, but the medicines that include nicotine help to relieve withdrawal symptoms. Your health care provider may recommend:  ¨ Nicotine patches, gum, or lozenges.  ¨ Nicotine inhalers or sprays.  ¨ Non-nicotine medicine that is taken by mouth.  Talk with your health care provider about combining strategies, such as taking medicines while you are also receiving in-person counseling. Using these two strategies together makes you more likely to succeed in quitting than if you used either strategy on its own.  If you are pregnant or breastfeeding, talk with your health care provider about finding counseling or other support strategies to quit smoking. Do not take medicine to help you quit smoking unless told to do so by your health care  provider.  What things can I do to make it easier to quit?  Quitting smoking might feel overwhelming at first, but there is a lot that you can do to make it easier. Take these important actions:  · Reach out to your family and friends and ask that they support and encourage you during this time. Call telephone quitlines, reach out to support groups, or work with a counselor for support.  · Ask people who smoke to avoid smoking around you.  · Avoid places that trigger you to smoke, such as bars, parties, or smoke-break areas at work.  · Spend time around people who do not smoke.  · Lessen stress in your life, because stress can be a smoking trigger for some people. To lessen stress, try:  ¨ Exercising regularly.  ¨ Deep-breathing exercises.  ¨ Yoga.  ¨ Meditating.  ¨ Performing a body scan. This involves closing your eyes, scanning your body from head to toe, and noticing which parts of your body are particularly tense. Purposefully relax the muscles in those areas.  · Download or purchase mobile phone or tablet apps (applications) that can help you stick to your quit plan by providing reminders, tips, and encouragement. There are many free apps, such as QuitGuide from the CDC (Centers for Disease Control and Prevention). You can find other support for quitting smoking (smoking cessation) through smokefree.gov and other websites.  How will I feel when I quit smoking?  Within the first 24 hours of quitting smoking, you may start to feel some withdrawal symptoms. These symptoms are usually most noticeable 2-3 days after quitting, but they usually do not last beyond 2-3 weeks. Changes or symptoms that you might experience include:  · Mood swings.  · Restlessness, anxiety, or irritation.  · Difficulty concentrating.  · Dizziness.  · Strong cravings for sugary foods in addition to nicotine.  · Mild weight gain.  · Constipation.  · Nausea.  · Coughing or a sore throat.  · Changes in how your medicines work in your  body.  · A depressed mood.  · Difficulty sleeping (insomnia).  After the first 2-3 weeks of quitting, you may start to notice more positive results, such as:  · Improved sense of smell and taste.  · Decreased coughing and sore throat.  · Slower heart rate.  · Lower blood pressure.  · Clearer skin.  · The ability to breathe more easily.  · Fewer sick days.  Quitting smoking is very challenging for most people. Do not get discouraged if you are not successful the first time. Some people need to make many attempts to quit before they achieve long-term success. Do your best to stick to your quit plan, and talk with your health care provider if you have any questions or concerns.  This information is not intended to replace advice given to you by your health care provider. Make sure you discuss any questions you have with your health care provider.  Document Released: 12/12/2002 Document Revised: 08/15/2017 Document Reviewed: 05/03/2016  [employer] Interactive Patient Education © 2017 [employer]

## 2019-11-21 NOTE — PROGRESS NOTES
Chief Complaint   Patient presents with   • Back Pain       HISTORY OF PRESENT ILLNESS: Patient is a 39 y.o. male who presents today because he fell about 2 weeks ago and felt some pain in his lower back.  It was not too bad so he went about his regular activities without taking any medications.  However over the last day or 2 he bent over and felt a popping sensation in his back and has had pain that radiates around to his right groin and down his right leg.  No numbness or distal paresthesias    Patient Active Problem List    Diagnosis Date Noted   • Obesity (BMI 35.0-39.9 without comorbidity) ([facility]) 2018   • CASEY (obstructive sleep apnea) 2017   • BMI 35.0-35.9,adult 2017   • Allergic rhinitis 2017       Allergies:Pcn [penicillins]    Current Outpatient Medications Ordered in Epic   Medication Sig Dispense Refill   • ibuprofen (MOTRIN) 800 MG Tab Take 1 Tab by mouth every 8 hours as needed. 30 Tab 0   • methylPREDNISolone (MEDROL DOSEPAK) 4 MG Tablet Therapy Pack Take 1 Tab by mouth See Admin Instructions. 21 Tab 0   • fluticasone (FLONASE) 50 MCG/ACT nasal spray Spray 2 Sprays in nose every day. 1 Bottle 0   • azithromycin (ZITHROMAX) 250 MG Tab Take 2 tabs by mouth on day 1, then take 1 tab on days 2-5 6 Tab 0     No current Epic-ordered facility-administered medications on file.        Past Medical History:   Diagnosis Date   • Chickenpox        Social History     Tobacco Use   • Smoking status: Current Every Day Smoker     Packs/day: 0.50     Years: 18.00     Pack years: 9.00     Types: Cigarettes     Last attempt to quit: 3/1/2017     Years since quittin.7   • Smokeless tobacco: Never Used   Substance Use Topics   • Alcohol use: Yes     Alcohol/week: 1.8 oz     Types: 3 Cans of beer per week     Drinks per session: 3 or 4     Comment: Daily   • Drug use: No     Comment: Rarely (recreationally/medicinally)       Family Status   Relation Name Status   • Fa  (Not Specified)   • Neg Hx   "(Not Specified)     Family History   Problem Relation Age of Onset   • Heart Disease Father    • Sleep Apnea Neg Hx    • Hypertension Neg Hx    • Respiratory Disease Neg Hx    • Asthma Neg Hx    • Cancer Neg Hx        ROS:  Review of Systems   Constitutional: Negative for fever, chills, weight loss and malaise/fatigue.   HENT: Negative for ear pain, nosebleeds, congestion, sore throat and neck pain.    Eyes: Negative for blurred vision.   Respiratory: Negative for cough, sputum production, shortness of breath and wheezing.    Cardiovascular: Negative for chest pain, palpitations, orthopnea and leg swelling.   Gastrointestinal: Negative for heartburn, nausea, vomiting and abdominal pain.   Genitourinary: Negative for dysuria, urgency and frequency.     Exam:  /98 (BP Location: Right arm, Patient Position: Sitting, BP Cuff Size: Large adult)   Pulse 68   Temp 36.2 °C (97.2 °F) (Temporal)   Resp 16   Ht 1.753 m (5' 9\")   Wt 116.1 kg (256 lb)   SpO2 98%   General:  Well nourished, well developed male in NAD  Head:Normocephalic, atraumatic  Eyes: PERRLA, EOM within normal limits, no conjunctival injection, no scleral icterus, visual fields and acuity grossly intact.  Nose: Symmetrical without tenderness, no discharge.  Mouth: reasonable hygiene, no erythema exudates or tonsillar enlargement.  Neck: no masses, range of motion within normal limits, no tracheal deviation. No obvious thyroid enlargement.  Extremities: no clubbing, cyanosis, or edema.  MSK: Lumbar spine is without any visual deformity, erythema, edema or ecchymosis, no tenderness to palpation    Please note that this dictation was created using voice recognition software. I have made every reasonable attempt to correct obvious errors, but I expect that there are errors of grammar and possibly content that I did not discover before finalizing the note.    Assessment/Plan:  1. Lumbar radiculopathy  ibuprofen (MOTRIN) 800 MG Tab    methylPREDNISolone " (MEDROL DOSEPAK) 4 MG Tablet Therapy Pack       Followup with primary care in the next 7-10 days if not significantly improving, return to the urgent care or go to the emergency room sooner for any worsening of symptoms.

## 2020-01-28 ENCOUNTER — OFFICE VISIT (OUTPATIENT)
Dept: URGENT CARE | Facility: PHYSICIAN GROUP | Age: 40
End: 2020-01-28
Payer: COMMERCIAL

## 2020-01-28 VITALS
SYSTOLIC BLOOD PRESSURE: 142 MMHG | TEMPERATURE: 98.9 F | WEIGHT: 257 LBS | DIASTOLIC BLOOD PRESSURE: 94 MMHG | HEART RATE: 100 BPM | HEIGHT: 69 IN | RESPIRATION RATE: 16 BRPM | BODY MASS INDEX: 38.06 KG/M2 | OXYGEN SATURATION: 96 %

## 2020-01-28 DIAGNOSIS — J02.0 STREP PHARYNGITIS: ICD-10-CM

## 2020-01-28 PROCEDURE — 99214 OFFICE O/P EST MOD 30 MIN: CPT | Performed by: PHYSICIAN ASSISTANT

## 2020-01-28 RX ORDER — AZITHROMYCIN 250 MG/1
TABLET, FILM COATED ORAL
Qty: 6 TAB | Refills: 0 | Status: SHIPPED | OUTPATIENT
Start: 2020-01-28 | End: 2021-04-29

## 2020-01-28 NOTE — PROGRESS NOTES
Chief Complaint   Patient presents with   • Pharyngitis       HISTORY OF PRESENT ILLNESS: Patient is a 39 y.o. male who presents today because he has a 2-day history of sore throat, body aches, fevers and right ear pain.  He has been taking over-the-counter medications without improvement    Patient Active Problem List    Diagnosis Date Noted   • Obesity (BMI 35.0-39.9 without comorbidity) ([facility]) 2018   • CASEY (obstructive sleep apnea) 2017   • BMI 35.0-35.9,adult 2017   • Allergic rhinitis 2017       Allergies:Pcn [penicillins]    Current Outpatient Medications Ordered in Epic   Medication Sig Dispense Refill   • azithromycin (ZITHROMAX) 250 MG Tab Follow package directions 6 Tab 0   • ibuprofen (MOTRIN) 800 MG Tab Take 1 Tab by mouth every 8 hours as needed. 30 Tab 0   • methylPREDNISolone (MEDROL DOSEPAK) 4 MG Tablet Therapy Pack Take 1 Tab by mouth See Admin Instructions. 21 Tab 0   • fluticasone (FLONASE) 50 MCG/ACT nasal spray Spray 2 Sprays in nose every day. 1 Bottle 0   • azithromycin (ZITHROMAX) 250 MG Tab Take 2 tabs by mouth on day 1, then take 1 tab on days 2-5 6 Tab 0     No current Epic-ordered facility-administered medications on file.        Past Medical History:   Diagnosis Date   • Chickenpox        Social History     Tobacco Use   • Smoking status: Current Every Day Smoker     Packs/day: 0.50     Years: 18.00     Pack years: 9.00     Types: Cigarettes     Last attempt to quit: 3/1/2017     Years since quittin.9   • Smokeless tobacco: Never Used   Substance Use Topics   • Alcohol use: Yes     Alcohol/week: 1.8 oz     Types: 3 Cans of beer per week     Drinks per session: 3 or 4     Comment: Daily   • Drug use: No     Comment: Rarely (recreationally/medicinally)       Family Status   Relation Name Status   • Fa  (Not Specified)   • Neg Hx  (Not Specified)     Family History   Problem Relation Age of Onset   • Heart Disease Father    • Sleep Apnea Neg Hx    • Hypertension  "Neg Hx    • Respiratory Disease Neg Hx    • Asthma Neg Hx    • Cancer Neg Hx        ROS:  Review of Systems   Constitutional: Positive for for fever, chills, myalgias and malaise/fatigue.   HENT: Positive for right ear pain, no nosebleeds, congestion, positive for sore throat and neck pain.    Eyes: Negative for blurred vision.   Respiratory: Positive for cough, sputum production, no shortness of breath and wheezing.    Cardiovascular: Negative for chest pain, palpitations, orthopnea and leg swelling.   Gastrointestinal: Negative for heartburn, nausea, vomiting and abdominal pain.   Genitourinary: Negative for dysuria, urgency and frequency.     Exam:  /94 (BP Location: Right arm, Patient Position: Sitting, BP Cuff Size: Large adult)   Pulse 100   Temp 37.2 °C (98.9 °F) (Temporal)   Resp 16   Ht 1.753 m (5' 9\")   Wt 116.6 kg (257 lb)   SpO2 96%   General:  Well nourished, well developed male in NAD  Head:Normocephalic, atraumatic  Eyes: PERRLA, EOM within normal limits, no conjunctival injection, no scleral icterus, visual fields and acuity grossly intact.  Ears: Normal shape and symmetry, no tenderness, no discharge. External canals are without any significant edema or erythema. Tympanic membranes are without any inflammation, moderate amount of cloudy fluid behind his tympanic membranes bilaterally. Gross auditory acuity is intact  Nose: Symmetrical without tenderness, no discharge.  Mouth: reasonable hygiene, he has significant pharyngeal and tonsillar erythema, bilateral exudates and bilateral tonsillar enlargement.  Uvula is midline  Neck: There is bilateral anterior cervical lymph node enlargement and tenderness, range of motion within normal limits, no tracheal deviation. No obvious thyroid enlargement.  Pulmonary: chest is symmetrical with respiration, scattered wheezes and rhonchi bilaterally, not clearing with cough cardiovascular: regular rate and rhythm without murmurs, rubs, or " gallops.  Extremities: no clubbing, cyanosis, or edema.    Please note that this dictation was created using voice recognition software. I have made every reasonable attempt to correct obvious errors, but I expect that there are errors of grammar and possibly content that I did not discover before finalizing the note.    Assessment/Plan:  1. Strep pharyngitis  azithromycin (ZITHROMAX) 250 MG Tab   Over-the-counter decongestants, DayQuil, NyQuil as tolerated    Followup with primary care in the next 7-10 days if not significantly improving, return to the urgent care or go to the emergency room sooner for any worsening of symptoms.

## 2020-11-30 ENCOUNTER — HOSPITAL ENCOUNTER (OUTPATIENT)
Dept: RADIOLOGY | Facility: MEDICAL CENTER | Age: 40
End: 2020-11-30
Attending: PHYSICIAN ASSISTANT
Payer: COMMERCIAL

## 2020-11-30 DIAGNOSIS — S33.6XXD SPRAIN OF SACROILIAC LIGAMENT, SUBSEQUENT ENCOUNTER: ICD-10-CM

## 2020-11-30 DIAGNOSIS — M54.16 LUMBAR RADICULOPATHY: ICD-10-CM

## 2020-11-30 PROCEDURE — 72202 X-RAY EXAM SI JOINTS 3/> VWS: CPT

## 2020-11-30 PROCEDURE — 72110 X-RAY EXAM L-2 SPINE 4/>VWS: CPT

## 2021-04-29 ENCOUNTER — OFFICE VISIT (OUTPATIENT)
Dept: URGENT CARE | Facility: PHYSICIAN GROUP | Age: 41
End: 2021-04-29
Payer: COMMERCIAL

## 2021-04-29 ENCOUNTER — HOSPITAL ENCOUNTER (OUTPATIENT)
Facility: MEDICAL CENTER | Age: 41
End: 2021-04-29
Attending: NURSE PRACTITIONER
Payer: COMMERCIAL

## 2021-04-29 VITALS
HEART RATE: 61 BPM | RESPIRATION RATE: 12 BRPM | TEMPERATURE: 98.1 F | OXYGEN SATURATION: 96 % | SYSTOLIC BLOOD PRESSURE: 140 MMHG | WEIGHT: 254 LBS | BODY MASS INDEX: 36.36 KG/M2 | DIASTOLIC BLOOD PRESSURE: 92 MMHG | HEIGHT: 70 IN

## 2021-04-29 DIAGNOSIS — J06.9 UPPER RESPIRATORY TRACT INFECTION, UNSPECIFIED TYPE: ICD-10-CM

## 2021-04-29 DIAGNOSIS — Z11.52 ENCOUNTER FOR SCREENING FOR COVID-19: ICD-10-CM

## 2021-04-29 LAB — COVID ORDER STATUS COVID19: NORMAL

## 2021-04-29 PROCEDURE — U0005 INFEC AGEN DETEC AMPLI PROBE: HCPCS

## 2021-04-29 PROCEDURE — U0003 INFECTIOUS AGENT DETECTION BY NUCLEIC ACID (DNA OR RNA); SEVERE ACUTE RESPIRATORY SYNDROME CORONAVIRUS 2 (SARS-COV-2) (CORONAVIRUS DISEASE [COVID-19]), AMPLIFIED PROBE TECHNIQUE, MAKING USE OF HIGH THROUGHPUT TECHNOLOGIES AS DESCRIBED BY CMS-2020-01-R: HCPCS

## 2021-04-29 PROCEDURE — 99213 OFFICE O/P EST LOW 20 MIN: CPT | Performed by: NURSE PRACTITIONER

## 2021-04-29 RX ORDER — LISINOPRIL AND HYDROCHLOROTHIAZIDE 25; 20 MG/1; MG/1
1 TABLET ORAL
COMMUNITY
Start: 2021-04-08 | End: 2023-10-08

## 2021-04-29 RX ORDER — ALLOPURINOL 300 MG/1
300 TABLET ORAL
COMMUNITY
Start: 2021-04-08 | End: 2023-10-08

## 2021-04-29 RX ORDER — TRAZODONE HYDROCHLORIDE 50 MG/1
TABLET ORAL
COMMUNITY
Start: 2021-02-01 | End: 2023-10-08

## 2021-04-29 ASSESSMENT — ENCOUNTER SYMPTOMS
FEVER: 0
COUGH: 1
CHILLS: 0
ABDOMINAL PAIN: 0
SHORTNESS OF BREATH: 0
HEADACHES: 0
SORE THROAT: 0
WHEEZING: 0
DIARRHEA: 0
SINUS PAIN: 1
CONSTIPATION: 0
NAUSEA: 0

## 2021-04-29 NOTE — PROGRESS NOTES
Subjective:   Kapil Small is a 40 y.o. male who presents for Sinus Problem (Head pressure and cough, x1day ) and Other (Needs covid test for release )      HPI  40 year old male in urgent care for 1 day of sinus pressure and cough.  Patient states that his children recently sick but it seems to be a self-limiting virus.  Patient needs to be tested for Covid in order to go back to work.  Denies any Covid exposure.  He has taken over-the-counter Zicam.  When asked about his elevated blood pressure patient states he has been taking his medication as prescribed.    Review of Systems   Constitutional: Negative for chills, fever and malaise/fatigue.   HENT: Positive for congestion and sinus pain. Negative for sore throat.    Respiratory: Positive for cough. Negative for shortness of breath and wheezing.    Gastrointestinal: Negative for abdominal pain, constipation, diarrhea and nausea.   Neurological: Negative for headaches.       Patient Active Problem List   Diagnosis   • CASEY (obstructive sleep apnea)   • BMI 35.0-35.9,adult   • Allergic rhinitis   • Obesity (BMI 35.0-39.9 without comorbidity) ([facility])     Past Surgical History:   Procedure Laterality Date   • DENTAL EXTRACTION(S)      [place] [facility]     Social History     Tobacco Use   • Smoking status: Current Every Day Smoker     Packs/day: 0.50     Years: 18.00     Pack years: 9.00     Types: Cigarettes     Last attempt to quit: 3/1/2017     Years since quittin.1   • Smokeless tobacco: Never Used   Substance Use Topics   • Alcohol use: Yes     Alcohol/week: 1.8 oz     Types: 3 Cans of beer per week     Comment: Daily   • Drug use: No     Comment: Rarely (recreationally/medicinally)      Family History   Problem Relation Age of Onset   • Heart Disease Father    • Sleep Apnea Neg Hx    • Hypertension Neg Hx    • Respiratory Disease Neg Hx    • Asthma Neg Hx    • Cancer Neg Hx       (Allergies, Medications, & Tobacco/Substance Use were reconciled by the Medical  "Assistant and reviewed by myself. The family history is prepopulated)     Objective:     /92   Pulse 61   Temp 36.7 °C (98.1 °F) (Temporal)   Resp 12   Ht 1.778 m (5' 10\")   Wt 115 kg (254 lb)   SpO2 96%   BMI 36.45 kg/m²     Physical Exam  Vitals reviewed.   Constitutional:       Appearance: Normal appearance.   HENT:      Right Ear: Tympanic membrane, ear canal and external ear normal.      Left Ear: Tympanic membrane, ear canal and external ear normal.      Nose: Nose normal.      Mouth/Throat:      Lips: Pink.      Mouth: Mucous membranes are moist.      Pharynx: Uvula midline.   Cardiovascular:      Rate and Rhythm: Normal rate and regular rhythm.      Heart sounds: Normal heart sounds.   Pulmonary:      Effort: Pulmonary effort is normal.      Breath sounds: Normal breath sounds.   Abdominal:      General: Abdomen is flat. Bowel sounds are normal.      Palpations: Abdomen is soft.   Neurological:      Mental Status: He is alert and oriented to person, place, and time.   Psychiatric:         Mood and Affect: Mood normal.         Behavior: Behavior normal.         Thought Content: Thought content normal.         Judgment: Judgment normal.         Assessment/Plan:     1. Encounter for screening for COVID-19  COVID/SARS CoV-2 PCR   2. Upper respiratory tract infection, unspecified type       Discussed Physical examination findings with patient are likely viral in etiology and could be due to COVID so will perform testing. Advised patient to remain in self isolation until cleared by a negative test or the health department, if they test positive. If exposed to COVID, advised to stay home for 10 days post exposure due to the possibility of developing symptoms day 2-10 post exposure. Will release results to [facility]. Strict ER precautions provided for worsening symptoms including SOB, difficulty breathing or high fever unable to be controlled by OTC tylenol or NSAIDS. Viral illness are largely self " limiting and patient should increase fluids using electrolyte enriched beverages as well as OTC medications such as tylenol and ibuprofen per 's instructions.      Appropriate PPE worn at all times by provider. Patient had face mask on for entirety of visit other than during oropharyngeal examination    Work note provided    Provider retook blood pressure at end of visit it was 140/98. Advised to continue to take blood pressure medication as prescribed and follow up with PCP if Bp readings are consistently elevated. Also advised he should be taking OTC medication like coricidin HPB.     Patient expressed understanding, agrees to plan and has no further questions at this time    Differential diagnosis, natural history, supportive care, and indications for immediate follow-up discussed.    Advised the patient to follow-up with the primary care physician for recheck, reevaluation, and consideration of further management.    Please note that this dictation was created using voice recognition software. I have made a reasonable attempt to correct obvious errors, but I expect that there are errors of grammar and possibly content that I did not discover before finalizing the note.    This note was electronically signed ORBERT Givens

## 2021-04-30 LAB
SARS-COV-2 RNA RESP QL NAA+PROBE: NOTDETECTED
SPECIMEN SOURCE: NORMAL

## 2021-07-20 ENCOUNTER — HOSPITAL ENCOUNTER (OUTPATIENT)
Dept: LAB | Facility: MEDICAL CENTER | Age: 41
End: 2021-07-20
Attending: FAMILY MEDICINE
Payer: COMMERCIAL

## 2021-07-21 ENCOUNTER — HOSPITAL ENCOUNTER (OUTPATIENT)
Dept: LAB | Facility: MEDICAL CENTER | Age: 41
End: 2021-07-21
Attending: FAMILY MEDICINE
Payer: COMMERCIAL

## 2021-07-21 PROCEDURE — 84550 ASSAY OF BLOOD/URIC ACID: CPT

## 2021-07-21 PROCEDURE — 80061 LIPID PANEL: CPT

## 2021-07-21 PROCEDURE — 80076 HEPATIC FUNCTION PANEL: CPT

## 2021-07-21 PROCEDURE — 36415 COLL VENOUS BLD VENIPUNCTURE: CPT

## 2021-07-22 LAB
ALBUMIN SERPL BCP-MCNC: 4.2 G/DL (ref 3.2–4.9)
ALP SERPL-CCNC: 48 U/L (ref 30–99)
ALT SERPL-CCNC: 28 U/L (ref 2–50)
AST SERPL-CCNC: 25 U/L (ref 12–45)
BILIRUB CONJ SERPL-MCNC: <0.2 MG/DL (ref 0.1–0.5)
BILIRUB INDIRECT SERPL-MCNC: NORMAL MG/DL (ref 0–1)
BILIRUB SERPL-MCNC: 0.2 MG/DL (ref 0.1–1.5)
CHOLEST SERPL-MCNC: 127 MG/DL (ref 100–199)
FASTING STATUS PATIENT QL REPORTED: NORMAL
HDLC SERPL-MCNC: 43 MG/DL
LDLC SERPL CALC-MCNC: 70 MG/DL
PROT SERPL-MCNC: 6.7 G/DL (ref 6–8.2)
TRIGL SERPL-MCNC: 68 MG/DL (ref 0–149)
URATE SERPL-MCNC: 7.2 MG/DL (ref 2.5–8.3)

## 2023-10-08 ENCOUNTER — OFFICE VISIT (OUTPATIENT)
Dept: URGENT CARE | Facility: CLINIC | Age: 43
End: 2023-10-08
Payer: COMMERCIAL

## 2023-10-08 VITALS
WEIGHT: 247 LBS | TEMPERATURE: 97.8 F | DIASTOLIC BLOOD PRESSURE: 82 MMHG | BODY MASS INDEX: 36.58 KG/M2 | HEIGHT: 69 IN | OXYGEN SATURATION: 98 % | SYSTOLIC BLOOD PRESSURE: 128 MMHG | RESPIRATION RATE: 14 BRPM | HEART RATE: 76 BPM

## 2023-10-08 DIAGNOSIS — J02.9 SORE THROAT: ICD-10-CM

## 2023-10-08 LAB — S PYO DNA SPEC NAA+PROBE: NOT DETECTED

## 2023-10-08 PROCEDURE — 99213 OFFICE O/P EST LOW 20 MIN: CPT | Performed by: FAMILY MEDICINE

## 2023-10-08 PROCEDURE — 3079F DIAST BP 80-89 MM HG: CPT | Performed by: FAMILY MEDICINE

## 2023-10-08 PROCEDURE — 3074F SYST BP LT 130 MM HG: CPT | Performed by: FAMILY MEDICINE

## 2023-10-08 PROCEDURE — 87651 STREP A DNA AMP PROBE: CPT | Performed by: FAMILY MEDICINE

## 2024-07-04 ENCOUNTER — OFFICE VISIT (OUTPATIENT)
Dept: URGENT CARE | Facility: PHYSICIAN GROUP | Age: 44
End: 2024-07-04
Payer: COMMERCIAL

## 2024-07-04 VITALS
RESPIRATION RATE: 18 BRPM | DIASTOLIC BLOOD PRESSURE: 82 MMHG | TEMPERATURE: 97.7 F | OXYGEN SATURATION: 99 % | HEART RATE: 85 BPM | BODY MASS INDEX: 35.93 KG/M2 | WEIGHT: 251 LBS | HEIGHT: 70 IN | SYSTOLIC BLOOD PRESSURE: 130 MMHG

## 2024-07-04 DIAGNOSIS — J02.9 ALLERGIC PHARYNGITIS: ICD-10-CM

## 2024-07-04 LAB — S PYO DNA SPEC NAA+PROBE: NOT DETECTED

## 2024-07-04 PROCEDURE — 3079F DIAST BP 80-89 MM HG: CPT

## 2024-07-04 PROCEDURE — 99203 OFFICE O/P NEW LOW 30 MIN: CPT | Mod: 25

## 2024-07-04 PROCEDURE — 87651 STREP A DNA AMP PROBE: CPT

## 2024-07-04 PROCEDURE — 3075F SYST BP GE 130 - 139MM HG: CPT

## 2024-07-04 RX ORDER — AZELASTINE 1 MG/ML
1 SPRAY, METERED NASAL 2 TIMES DAILY
Qty: 30 ML | Refills: 0 | Status: SHIPPED | OUTPATIENT
Start: 2024-07-04

## 2024-07-04 RX ORDER — CETIRIZINE HYDROCHLORIDE 10 MG/1
10 TABLET ORAL DAILY
Qty: 30 TABLET | Refills: 0 | Status: SHIPPED | OUTPATIENT
Start: 2024-07-04

## 2024-07-04 RX ORDER — LIDOCAINE HYDROCHLORIDE 20 MG/ML
SOLUTION OROPHARYNGEAL
Qty: 100 ML | Refills: 0 | Status: SHIPPED | OUTPATIENT
Start: 2024-07-04

## 2024-07-04 ASSESSMENT — ENCOUNTER SYMPTOMS
VOMITING: 0
STRIDOR: 0
TROUBLE SWALLOWING: 0
CHILLS: 0
NECK PAIN: 0
FEVER: 0
BACK PAIN: 0
SINUS PAIN: 0
SORE THROAT: 1
WEIGHT LOSS: 0
EYE PAIN: 0
DIARRHEA: 0
EYE DISCHARGE: 0
MYALGIAS: 0
ABDOMINAL PAIN: 0
HEADACHES: 0
SHORTNESS OF BREATH: 0
COUGH: 0
SWOLLEN GLANDS: 1
HOARSE VOICE: 0

## 2024-07-06 ENCOUNTER — APPOINTMENT (OUTPATIENT)
Dept: TELEHEALTH | Facility: TELEMEDICINE | Age: 44
End: 2024-07-06
Payer: COMMERCIAL

## 2024-07-06 ENCOUNTER — OFFICE VISIT (OUTPATIENT)
Dept: URGENT CARE | Facility: PHYSICIAN GROUP | Age: 44
End: 2024-07-06
Payer: COMMERCIAL

## 2024-07-06 VITALS
BODY MASS INDEX: 35.79 KG/M2 | OXYGEN SATURATION: 95 % | RESPIRATION RATE: 16 BRPM | TEMPERATURE: 97.7 F | HEIGHT: 70 IN | WEIGHT: 250 LBS | SYSTOLIC BLOOD PRESSURE: 134 MMHG | HEART RATE: 86 BPM | DIASTOLIC BLOOD PRESSURE: 86 MMHG

## 2024-07-06 DIAGNOSIS — M10.9 ACUTE GOUT INVOLVING TOE OF LEFT FOOT, UNSPECIFIED CAUSE: ICD-10-CM

## 2024-07-06 PROCEDURE — 99213 OFFICE O/P EST LOW 20 MIN: CPT | Performed by: FAMILY MEDICINE

## 2024-07-06 PROCEDURE — 3079F DIAST BP 80-89 MM HG: CPT | Performed by: FAMILY MEDICINE

## 2024-07-06 PROCEDURE — 3075F SYST BP GE 130 - 139MM HG: CPT | Performed by: FAMILY MEDICINE

## 2024-07-06 RX ORDER — INDOMETHACIN 50 MG/1
50 CAPSULE ORAL EVERY 8 HOURS PRN
Qty: 45 CAPSULE | Refills: 0 | Status: SHIPPED | OUTPATIENT
Start: 2024-07-06

## 2024-07-06 RX ORDER — METHYLPREDNISOLONE 4 MG/1
TABLET ORAL
Qty: 21 TABLET | Refills: 0 | Status: SHIPPED | OUTPATIENT
Start: 2024-07-06

## 2025-03-24 ENCOUNTER — APPOINTMENT (OUTPATIENT)
Dept: RADIOLOGY | Facility: IMAGING CENTER | Age: 45
End: 2025-03-24
Attending: PHYSICIAN ASSISTANT
Payer: COMMERCIAL

## 2025-03-24 ENCOUNTER — OFFICE VISIT (OUTPATIENT)
Dept: URGENT CARE | Facility: CLINIC | Age: 45
End: 2025-03-24
Payer: COMMERCIAL

## 2025-03-24 VITALS
HEIGHT: 70 IN | WEIGHT: 253.53 LBS | RESPIRATION RATE: 14 BRPM | DIASTOLIC BLOOD PRESSURE: 126 MMHG | SYSTOLIC BLOOD PRESSURE: 200 MMHG | OXYGEN SATURATION: 98 % | BODY MASS INDEX: 36.3 KG/M2 | TEMPERATURE: 98.2 F | HEART RATE: 106 BPM

## 2025-03-24 DIAGNOSIS — S69.91XA INJURY OF RIGHT WRIST, INITIAL ENCOUNTER: ICD-10-CM

## 2025-03-24 DIAGNOSIS — R03.0 ELEVATED BLOOD PRESSURE READING IN OFFICE WITHOUT DIAGNOSIS OF HYPERTENSION: ICD-10-CM

## 2025-03-24 DIAGNOSIS — S63.501A WRIST SPRAIN, RIGHT, INITIAL ENCOUNTER: Primary | ICD-10-CM

## 2025-03-24 PROCEDURE — 73110 X-RAY EXAM OF WRIST: CPT | Mod: TC,RT | Performed by: RADIOLOGY

## 2025-03-24 PROCEDURE — 3077F SYST BP >= 140 MM HG: CPT | Performed by: PHYSICIAN ASSISTANT

## 2025-03-24 PROCEDURE — 3080F DIAST BP >= 90 MM HG: CPT | Performed by: PHYSICIAN ASSISTANT

## 2025-03-24 PROCEDURE — 99213 OFFICE O/P EST LOW 20 MIN: CPT | Performed by: PHYSICIAN ASSISTANT

## 2025-03-24 NOTE — PROGRESS NOTES
"Debbie Small is a 44 y.o. male who presents with Wrist Injury (Right wrist popped after grabbing bag x 4 days)    PMH:  has a past medical history of Chickenpox.  MEDS: No current outpatient medications on file.  ALLERGIES:   Allergies   Allergen Reactions    Pcn [Penicillins] Hives     SURGHX:   Past Surgical History:   Procedure Laterality Date    DENTAL EXTRACTION(S)      [facility] Te[facility]     SOCHX:  reports that he has been smoking cigarettes. He started smoking about 26 years ago. He has a 9 pack-year smoking history. He has never used smokeless tobacco. He reports current alcohol use of about 1.8 oz of alcohol per week. He reports that he does not use drugs.  FH: Reviewed with patient, not pertinent to this visit.           Patient presents with:  Wrist Injury: Right wrist popped after grabbing bag x 4 days ago, still painful and swollen.  pT denies numbness tingling to hand, but wrist is so painful to lift/carry/push/pull.  PT denies previous injury to this wrist.  Patient has taken some over-the-counter ibuprofen for pain with some improvement.  No other complaints.          Wrist Injury         Review of Systems   Musculoskeletal:  Positive for joint pain (right wrist).   Neurological:  Negative for dizziness and headaches.   All other systems reviewed and are negative.             Objective     BP (!) 200/126   Pulse (!) 106   Temp 36.8 °C (98.2 °F) (Temporal)   Resp 14   Ht 1.778 m (5' 10\")   Wt 115 kg (253 lb 8.5 oz)   SpO2 98%   BMI 36.38 kg/m²      Physical Exam  Vitals and nursing note reviewed.   Constitutional:       General: He is not in acute distress.     Appearance: Normal appearance. He is well-developed.   HENT:      Head: Normocephalic and atraumatic.      Nose: Nose normal.      Mouth/Throat:      Mouth: Mucous membranes are moist.   Eyes:      Extraocular Movements: Extraocular movements intact.      Conjunctiva/sclera: Conjunctivae normal.      Pupils: Pupils are " equal, round, and reactive to light.   Cardiovascular:      Rate and Rhythm: Normal rate and regular rhythm.      Pulses: Normal pulses.      Heart sounds: Normal heart sounds.   Pulmonary:      Effort: Pulmonary effort is normal.      Breath sounds: Normal breath sounds.   Abdominal:      Palpations: Abdomen is soft.   Musculoskeletal:      Right wrist: Swelling, tenderness and bony tenderness present. No deformity, effusion, lacerations, snuff box tenderness or crepitus. Decreased range of motion. Normal pulse.        Arms:       Cervical back: Normal range of motion and neck supple.      Comments: Moderate ttp to distal ulna, near styloid.  Neg snuffbox.  No bruising, erythema or warmth to suggest infection or gout. Distal neuro/vascular intact.  Cap refill < 2 sec    Skin:     General: Skin is warm and dry.      Capillary Refill: Capillary refill takes less than 2 seconds.   Neurological:      General: No focal deficit present.      Mental Status: He is alert and oriented to person, place, and time.      Motor: No abnormal muscle tone.   Psychiatric:         Mood and Affect: Mood normal.                                  Assessment & Plan  Wrist sprain, right, initial encounter    Orders:    DX-WRIST-COMPLETE 3+ RIGHT; Future    Injury of right wrist, initial encounter    Orders:    DX-WRIST-COMPLETE 3+ RIGHT; Future    Elevated blood pressure reading in office without diagnosis of hypertension          Your blood pressure reading was elevated in clinic today.  We suggest you follow up with your PCP to discuss this finding.           RADIOLOGY RESULTS   DX-WRIST-COMPLETE 3+ RIGHT  Result Date: 3/24/2025  3/24/2025 1:14 PM HISTORY/REASON FOR EXAM:  Pain/Deformity Following Trauma TECHNIQUE/EXAM DESCRIPTION AND NUMBER OF VIEWS:  4 views of the RIGHT wrist. COMPARISON: None FINDINGS: No acute fracture or dislocation. The carpal rows appear intact. No joint osteoarthritis.     No acute osseous abnormality.              Patient HPI physical exam consistent with wrist sprain of right wrist.  Velcro wrist brace was placed on patient's wrist for support.    RICE TREATMENT FOR EXTREMITY INJURIES:  R-rest the extremity as much as possible while pain and swelling persist  I-ice the extremity 15 minutes every 2 hours for the first 24 hours, then 4-5 times daily   C-compress the extremity either with splint or ace wrap as directed  E-elevate the extremity to help with swelling      Motrin/Advil/Ibuprophen 600 mg every 6 hours as needed for pain or fever.    Differential diagnosis, supportive care, and indications for immediate follow-up discussed with patient.  Instructed to return to clinic or nearest emergency department for any change in condition, further concerns, or worsening of symptoms.    I personally reviewed prior external notes and test results pertinent to today's visit.  I have independently reviewed and interpreted all diagnostics ordered during this urgent care visit.    PT should follow up with PCP in 1-2 days for re-evaluation if symptoms have not improved.      Discussed red flags and reasons to return to UC or ED.      Pt and/or family verbalized understanding of diagnosis and follow up instructions and was offered informational handout on diagnosis.  PT discharged.     Please note that this dictation was created using voice recognition software. I have made every reasonable attempt to correct obvious errors, but I expect that there may be errors of grammar and possibly content that I did not discover before finalizing the note.

## 2025-03-24 NOTE — LETTER
March 24, 2025    To Whom It May Concern:         This is confirmation that Kapil S Staci attended his scheduled appointment with Shayna Meier P.A.-C. on 3/24/25.  PT can return to work without restrictions.          If you have any questions please do not hesitate to call me at the phone number listed below.    Sincerely,          Shayna Meier P.A.-C.  [phone]

## 2025-03-26 ASSESSMENT — ENCOUNTER SYMPTOMS
DIZZINESS: 0
HEADACHES: 0